# Patient Record
Sex: MALE | Race: WHITE | Employment: FULL TIME
[De-identification: names, ages, dates, MRNs, and addresses within clinical notes are randomized per-mention and may not be internally consistent; named-entity substitution may affect disease eponyms.]

---

## 2017-01-11 ENCOUNTER — OFFICE VISIT (OUTPATIENT)
Dept: INTERNAL MEDICINE | Facility: CLINIC | Age: 52
End: 2017-01-11

## 2017-01-11 VITALS
SYSTOLIC BLOOD PRESSURE: 110 MMHG | WEIGHT: 242 LBS | RESPIRATION RATE: 14 BRPM | HEIGHT: 72 IN | DIASTOLIC BLOOD PRESSURE: 80 MMHG | BODY MASS INDEX: 32.78 KG/M2

## 2017-01-11 DIAGNOSIS — E11.9 TYPE 2 DIABETES MELLITUS WITHOUT COMPLICATION, WITHOUT LONG-TERM CURRENT USE OF INSULIN (HCC): Primary | ICD-10-CM

## 2017-01-11 DIAGNOSIS — D12.6 TUBULAR ADENOMA OF COLON: ICD-10-CM

## 2017-01-11 DIAGNOSIS — N20.0 URIC ACID NEPHROLITHIASIS: ICD-10-CM

## 2017-01-11 PROCEDURE — 99213 OFFICE O/P EST LOW 20 MIN: CPT | Performed by: INTERNAL MEDICINE

## 2017-02-02 ENCOUNTER — CARE COORDINATION (OUTPATIENT)
Dept: CARE COORDINATION | Facility: CLINIC | Age: 52
End: 2017-02-02

## 2017-02-22 RX ORDER — ATORVASTATIN CALCIUM 10 MG/1
TABLET, FILM COATED ORAL
Qty: 30 TABLET | Refills: 10 | Status: SHIPPED | OUTPATIENT
Start: 2017-02-22 | End: 2017-05-03 | Stop reason: SDUPTHER

## 2017-04-12 ENCOUNTER — CARE COORDINATION (OUTPATIENT)
Dept: CARE COORDINATION | Age: 52
End: 2017-04-12

## 2017-04-26 RX ORDER — ALLOPURINOL 300 MG/1
TABLET ORAL
Qty: 90 TABLET | Refills: 3 | Status: SHIPPED | OUTPATIENT
Start: 2017-04-26 | End: 2017-12-14 | Stop reason: SDUPTHER

## 2017-04-27 ENCOUNTER — TELEPHONE (OUTPATIENT)
Dept: INTERNAL MEDICINE CLINIC | Age: 52
End: 2017-04-27

## 2017-04-27 DIAGNOSIS — E78.00 PURE HYPERCHOLESTEROLEMIA: ICD-10-CM

## 2017-04-27 DIAGNOSIS — E11.9 TYPE 2 DIABETES MELLITUS WITHOUT COMPLICATION, WITHOUT LONG-TERM CURRENT USE OF INSULIN (HCC): Primary | ICD-10-CM

## 2017-04-28 ENCOUNTER — HOSPITAL ENCOUNTER (OUTPATIENT)
Age: 52
Discharge: HOME | End: 2017-04-28
Payer: COMMERCIAL

## 2017-04-28 DIAGNOSIS — E78.00 PURE HYPERCHOLESTEROLEMIA: ICD-10-CM

## 2017-04-28 DIAGNOSIS — E11.9 TYPE 2 DIABETES MELLITUS WITHOUT COMPLICATION, WITHOUT LONG-TERM CURRENT USE OF INSULIN (HCC): ICD-10-CM

## 2017-04-28 LAB
ABSOLUTE EOS #: 0.09 K/UL (ref 0–0.4)
ABSOLUTE LYMPH #: 2.14 K/UL (ref 1–4.8)
ABSOLUTE MONO #: 0.39 K/UL (ref 0.1–1.3)
ALBUMIN SERPL-MCNC: 4.8 G/DL (ref 3.5–5.2)
ALBUMIN/GLOBULIN RATIO: ABNORMAL (ref 1–2.5)
ALP BLD-CCNC: 128 U/L (ref 40–129)
ALT SERPL-CCNC: 30 U/L (ref 5–41)
ANION GAP SERPL CALCULATED.3IONS-SCNC: 16 MMOL/L (ref 9–17)
AST SERPL-CCNC: 21 U/L
BASOPHILS # BLD: 0 %
BASOPHILS ABSOLUTE: 0 K/UL (ref 0–0.2)
BILIRUB SERPL-MCNC: 0.41 MG/DL (ref 0.3–1.2)
BUN BLDV-MCNC: 21 MG/DL (ref 6–20)
BUN/CREAT BLD: ABNORMAL (ref 9–20)
CALCIUM SERPL-MCNC: 9.9 MG/DL (ref 8.6–10.4)
CHLORIDE BLD-SCNC: 101 MMOL/L (ref 98–107)
CHOLESTEROL/HDL RATIO: 3
CHOLESTEROL: 124 MG/DL
CO2: 20 MMOL/L (ref 20–31)
CREAT SERPL-MCNC: 1.08 MG/DL (ref 0.7–1.2)
CREATININE URINE: 79.8 MG/DL (ref 39–259)
DIFFERENTIAL TYPE: ABNORMAL
EOSINOPHILS RELATIVE PERCENT: 2 %
ESTIMATED AVERAGE GLUCOSE: 194 MG/DL
GFR AFRICAN AMERICAN: >60 ML/MIN
GFR NON-AFRICAN AMERICAN: >60 ML/MIN
GFR SERPL CREATININE-BSD FRML MDRD: ABNORMAL ML/MIN/{1.73_M2}
GFR SERPL CREATININE-BSD FRML MDRD: ABNORMAL ML/MIN/{1.73_M2}
GLUCOSE BLD-MCNC: 146 MG/DL (ref 70–99)
HBA1C MFR BLD: 8.4 % (ref 4–6)
HCT VFR BLD CALC: 48.6 % (ref 41–53)
HDLC SERPL-MCNC: 41 MG/DL
HEMOGLOBIN: 16.1 G/DL (ref 13.5–17.5)
LDL CHOLESTEROL: 41 MG/DL (ref 0–130)
LYMPHOCYTES # BLD: 50 %
MCH RBC QN AUTO: 26.9 PG (ref 26–34)
MCHC RBC AUTO-ENTMCNC: 33.1 G/DL (ref 31–37)
MCV RBC AUTO: 81.3 FL (ref 80–100)
MICROALBUMIN/CREAT 24H UR: 25 MG/L
MICROALBUMIN/CREAT UR-RTO: 31 MCG/MG CREAT
MONOCYTES # BLD: 9 %
MORPHOLOGY: NORMAL
PDW BLD-RTO: 13.2 % (ref 11.5–14.9)
PLATELET # BLD: 270 K/UL (ref 150–450)
PLATELET ESTIMATE: ABNORMAL
PMV BLD AUTO: 7.2 FL (ref 6–12)
POTASSIUM SERPL-SCNC: 4.6 MMOL/L (ref 3.7–5.3)
RBC # BLD: 5.98 M/UL (ref 4.5–5.9)
RBC # BLD: ABNORMAL 10*6/UL
SEG NEUTROPHILS: 39 %
SEGMENTED NEUTROPHILS ABSOLUTE COUNT: 1.68 K/UL (ref 1.3–9.1)
SODIUM BLD-SCNC: 137 MMOL/L (ref 135–144)
TOTAL PROTEIN: 7.9 G/DL (ref 6.4–8.3)
TRIGL SERPL-MCNC: 208 MG/DL
TSH SERPL DL<=0.05 MIU/L-ACNC: 0.71 MIU/L (ref 0.3–5)
VITAMIN D 25-HYDROXY: 13.9 NG/ML (ref 30–100)
VLDLC SERPL CALC-MCNC: ABNORMAL MG/DL (ref 1–30)
WBC # BLD: 4.3 K/UL (ref 3.5–11)
WBC # BLD: ABNORMAL 10*3/UL

## 2017-04-28 PROCEDURE — 80053 COMPREHEN METABOLIC PANEL: CPT

## 2017-04-28 PROCEDURE — 83036 HEMOGLOBIN GLYCOSYLATED A1C: CPT

## 2017-04-28 PROCEDURE — 80061 LIPID PANEL: CPT

## 2017-04-28 PROCEDURE — 82306 VITAMIN D 25 HYDROXY: CPT

## 2017-04-28 PROCEDURE — 85025 COMPLETE CBC W/AUTO DIFF WBC: CPT

## 2017-04-28 PROCEDURE — 82043 UR ALBUMIN QUANTITATIVE: CPT

## 2017-04-28 PROCEDURE — 36415 COLL VENOUS BLD VENIPUNCTURE: CPT

## 2017-04-28 PROCEDURE — 82570 ASSAY OF URINE CREATININE: CPT

## 2017-04-28 PROCEDURE — 84443 ASSAY THYROID STIM HORMONE: CPT

## 2017-05-03 ENCOUNTER — OFFICE VISIT (OUTPATIENT)
Dept: INTERNAL MEDICINE CLINIC | Age: 52
End: 2017-05-03
Payer: COMMERCIAL

## 2017-05-03 VITALS
HEIGHT: 72 IN | BODY MASS INDEX: 33.18 KG/M2 | SYSTOLIC BLOOD PRESSURE: 98 MMHG | RESPIRATION RATE: 14 BRPM | DIASTOLIC BLOOD PRESSURE: 56 MMHG | WEIGHT: 245 LBS

## 2017-05-03 DIAGNOSIS — E11.9 TYPE 2 DIABETES MELLITUS WITHOUT COMPLICATION, WITHOUT LONG-TERM CURRENT USE OF INSULIN (HCC): Primary | ICD-10-CM

## 2017-05-03 DIAGNOSIS — N20.0 URIC ACID NEPHROLITHIASIS: ICD-10-CM

## 2017-05-03 DIAGNOSIS — E78.00 PURE HYPERCHOLESTEROLEMIA: ICD-10-CM

## 2017-05-03 PROCEDURE — 99214 OFFICE O/P EST MOD 30 MIN: CPT | Performed by: INTERNAL MEDICINE

## 2017-05-03 RX ORDER — ATORVASTATIN CALCIUM 10 MG/1
TABLET, FILM COATED ORAL
Qty: 30 TABLET | Refills: 10 | Status: SHIPPED | OUTPATIENT
Start: 2017-05-03 | End: 2017-05-04 | Stop reason: SDUPTHER

## 2017-05-03 RX ORDER — ERGOCALCIFEROL (VITAMIN D2) 1250 MCG
50000 CAPSULE ORAL WEEKLY
Qty: 12 CAPSULE | Refills: 3 | Status: SHIPPED | OUTPATIENT
Start: 2017-05-03 | End: 2017-05-04 | Stop reason: SDUPTHER

## 2017-05-03 ASSESSMENT — PATIENT HEALTH QUESTIONNAIRE - PHQ9
SUM OF ALL RESPONSES TO PHQ QUESTIONS 1-9: 0
SUM OF ALL RESPONSES TO PHQ9 QUESTIONS 1 & 2: 0
2. FEELING DOWN, DEPRESSED OR HOPELESS: 0
1. LITTLE INTEREST OR PLEASURE IN DOING THINGS: 0

## 2017-05-04 RX ORDER — ERGOCALCIFEROL (VITAMIN D2) 1250 MCG
50000 CAPSULE ORAL WEEKLY
Qty: 12 CAPSULE | Refills: 3 | Status: SHIPPED | OUTPATIENT
Start: 2017-05-04 | End: 2017-07-25 | Stop reason: SDUPTHER

## 2017-05-04 RX ORDER — ATORVASTATIN CALCIUM 10 MG/1
TABLET, FILM COATED ORAL
Qty: 30 TABLET | Refills: 10 | Status: ON HOLD | OUTPATIENT
Start: 2017-05-04 | End: 2018-01-22

## 2017-06-19 ENCOUNTER — CARE COORDINATION (OUTPATIENT)
Dept: CARE COORDINATION | Age: 52
End: 2017-06-19

## 2017-07-01 LAB
AVERAGE GLUCOSE: NORMAL
HBA1C MFR BLD: 7.2 %

## 2017-07-25 ENCOUNTER — CARE COORDINATION (OUTPATIENT)
Dept: CARE COORDINATION | Age: 52
End: 2017-07-25

## 2017-07-25 RX ORDER — METHOCARBAMOL 750 MG/1
1 TABLET ORAL WEEKLY
Refills: 3 | COMMUNITY
Start: 2017-06-25 | End: 2019-08-08 | Stop reason: SDUPTHER

## 2017-07-28 ENCOUNTER — CARE COORDINATION (OUTPATIENT)
Dept: CARE COORDINATION | Age: 52
End: 2017-07-28

## 2017-09-05 ENCOUNTER — CARE COORDINATION (OUTPATIENT)
Dept: CARE COORDINATION | Age: 52
End: 2017-09-05

## 2017-09-05 DIAGNOSIS — E11.9 TYPE 2 DIABETES MELLITUS WITHOUT COMPLICATION, WITHOUT LONG-TERM CURRENT USE OF INSULIN (HCC): Primary | ICD-10-CM

## 2017-10-04 DIAGNOSIS — E11.9 TYPE 2 DIABETES MELLITUS WITHOUT COMPLICATION, WITHOUT LONG-TERM CURRENT USE OF INSULIN (HCC): Primary | ICD-10-CM

## 2017-10-04 DIAGNOSIS — E11.9 TYPE 2 DIABETES MELLITUS WITHOUT COMPLICATION, WITHOUT LONG-TERM CURRENT USE OF INSULIN (HCC): ICD-10-CM

## 2017-10-05 ENCOUNTER — OFFICE VISIT (OUTPATIENT)
Dept: INTERNAL MEDICINE CLINIC | Age: 52
End: 2017-10-05
Payer: COMMERCIAL

## 2017-10-05 ENCOUNTER — CARE COORDINATION (OUTPATIENT)
Dept: CARE COORDINATION | Age: 52
End: 2017-10-05

## 2017-10-05 VITALS
WEIGHT: 243 LBS | BODY MASS INDEX: 32.91 KG/M2 | SYSTOLIC BLOOD PRESSURE: 110 MMHG | HEIGHT: 72 IN | DIASTOLIC BLOOD PRESSURE: 73 MMHG

## 2017-10-05 DIAGNOSIS — M54.5 LOW BACK PAIN, UNSPECIFIED BACK PAIN LATERALITY, UNSPECIFIED CHRONICITY, WITH SCIATICA PRESENCE UNSPECIFIED: ICD-10-CM

## 2017-10-05 DIAGNOSIS — M50.30 DEGENERATIVE DISC DISEASE, CERVICAL: ICD-10-CM

## 2017-10-05 DIAGNOSIS — N20.0 URIC ACID NEPHROLITHIASIS: ICD-10-CM

## 2017-10-05 DIAGNOSIS — G89.29 CHRONIC BILATERAL LOW BACK PAIN WITHOUT SCIATICA: ICD-10-CM

## 2017-10-05 DIAGNOSIS — Z86.010 HISTORY OF COLON POLYPS: ICD-10-CM

## 2017-10-05 DIAGNOSIS — M54.50 CHRONIC BILATERAL LOW BACK PAIN WITHOUT SCIATICA: ICD-10-CM

## 2017-10-05 DIAGNOSIS — E11.9 TYPE 2 DIABETES MELLITUS WITHOUT COMPLICATION, WITHOUT LONG-TERM CURRENT USE OF INSULIN (HCC): Primary | ICD-10-CM

## 2017-10-05 DIAGNOSIS — E11.9 TYPE 2 DIABETES MELLITUS WITHOUT COMPLICATION, WITHOUT LONG-TERM CURRENT USE OF INSULIN (HCC): ICD-10-CM

## 2017-10-05 PROCEDURE — 99214 OFFICE O/P EST MOD 30 MIN: CPT | Performed by: INTERNAL MEDICINE

## 2017-10-05 ASSESSMENT — ENCOUNTER SYMPTOMS: BACK PAIN: 1

## 2017-10-05 NOTE — CARE COORDINATION
RNCC met with patient while her in office for appointment. He has not had his diabetic education class yet, he said someone had called him. Gave him the number at [address] to call. A1C down from 8.4 to 7.2. He is also down couple of pounds. States he is trying to eat better. Labs ordered for vit d recheck as he was low few months ago.      Lab Results   Component Value Date    LABA1C 7.2 09/11/2017    LABA1C 8.4 (H) 04/28/2017    LABA1C 7.9 (H) 12/28/2016     Lab Results   Component Value Date    LABMICR 31 (H) 04/28/2017    CREATININE 1.08 04/28/2017     Wt Readings from Last 3 Encounters:   10/05/17 243 lb (110.2 kg)   05/03/17 245 lb (111.1 kg)   01/11/17 242 lb (109.8 kg)

## 2017-10-05 NOTE — MR AVS SNAPSHOT
people who are more muscular. Even a small weight loss (between 5 and 10 percent of your current weight) by decreasing your calorie intake and becoming more physically active will help lower your risk of developing or worsening diseases associated with obesity. Learn more at: Rachel.co.uk             Today's Medication Changes          These changes are accurate as of: 10/5/17 12:59 PM.  If you have any questions, ask your nurse or doctor. START taking these medications           empagliflozin 25 MG tablet   Commonly known as:  JARDIANCE   Instructions: Take 25 mg by mouth daily   Quantity:  30 tablet   Refills:  3         STOP taking these medications           canagliflozin 300 MG Tabs tablet   Commonly known as:  INVOKANA       ibuprofen 800 MG tablet   Commonly known as:  ADVIL;MOTRIN       ondansetron 4 MG disintegrating tablet   Commonly known as:  ZOFRAN ODT            Where to Get Your Medications      These medications were sent to [facility], [address] [place] [address] [id number], Replaced by [facility] [address]     Phone:  [phone]     empagliflozin 25 MG tablet    metFORMIN 1000 MG tablet               Your Current Medications Are              metFORMIN (GLUCOPHAGE) 1000 MG tablet Take 1 tablet by mouth 2 times daily (with meals)    empagliflozin (JARDIANCE) 25 MG tablet Take 25 mg by mouth daily    D3-50 27027 units CAPS Take 1 capsule by mouth once a week    atorvastatin (LIPITOR) 10 MG tablet TAKE 1 TABLET BY MOUTH ONE TIME A DAY    allopurinol (ZYLOPRIM) 300 MG tablet TAKE ONE TABLET BY MOUTH ONCE DAILY    ONE TOUCH LANCETS MISC 1 each by Does not apply route daily    glucose blood VI test strips (ASCENSIA AUTODISC VI;ONE TOUCH ULTRA TEST VI) strip Use with associated glucose meter.     Alcohol Swabs PADS Apply 1 each topically 3 times daily Pneumococcal Polysaccharide (Baruyqubp92) 3/17/2015, 11/20/2013    Tdap (Boostrix, Adacel) 8/27/2015      Preventive Care        Date Due    Hepatitis C screening is recommended for all adults regardless of risk factors born between [facility] at least once (lifetime) who have never been tested. 1965    HIV screening is recommended for all people regardless of risk factors  aged 15-65 years at least once (lifetime) who have never been HIV tested. 1/19/1980    Diabetic Foot Exam 3/2/2016    Yearly Flu Vaccine (1) 9/1/2017    Eye Exam By An Eye Doctor 3/30/2018    Urine Check For Kidney Problems 4/28/2018    Cholesterol Screening 4/28/2018    Hemoglobin A1C (Test For Long-Term Glucose Control) 9/11/2018    Colonoscopy 7/16/2021    Tetanus Combination Vaccine (2 - Td) 8/27/2025            Spanglehart Signup           Our records indicate that you have an active InitMe account. You can view your After Visit Summary by going to [email or web address] and logging in with your InitMe username and password. If you don't have a InitMe username and password but a parent or guardian has access to your record, the parent or guardian should login with their own InitMe username and password and access your record to view the After Visit Summary. Additional Information  If you have questions, please contact the physician practice where you receive care. Remember, InitMe is NOT to be used for urgent needs. For medical emergencies, dial 911. For questions regarding your InitMe account call [phone]. If you have a clinical question, please call your doctor's office.

## 2017-10-05 NOTE — PROGRESS NOTES
Visit Information    Have you changed or started any medications since your last visit including any over-the-counter medicines, vitamins, or herbal medicines? no   Are you having any side effects from any of your medications? -  no  Have you stopped taking any of your medications? Is so, why? -  no    Have you seen any other physician or provider since your last visit? Yes - Records Obtained  Have you had any other diagnostic tests since your last visit? Yes - Records Obtained  Have you been seen in the emergency room and/or had an admission to a hospital since we last saw you? No  Have you had your routine dental cleaning in the past 6 months? yes - 10/04/17    Have you activated your Navisw[employer] account? If not, what are your barriers?  Yes     Patient Care Team:  Deniz Reddy MD as PCP - Manish Cross MD as PCP - S Attributed Provider  Lashay Younger MD as Consulting Physician (Infectious Diseases)  Chris Lemos MD as Consulting Physician (Plastic Surgery)  Anders Gomez MD as Consulting Physician (Gastroenterology)  Nia Diaz RN as Care Coordinator    Medical History Review  Past Medical, Family, and Social History reviewed and does not contribute to the patient presenting condition    Health Maintenance   Topic Date Due    Hepatitis C screen  1965    HIV screen  01/19/1980    Diabetic foot exam  03/02/2016    Flu vaccine (1) 09/01/2017    Diabetic retinal exam  03/30/2018    Diabetic microalbuminuria test  04/28/2018    Lipid screen  04/28/2018    Diabetic hemoglobin A1C test  09/11/2018    Colon cancer screen colonoscopy  07/16/2021    DTaP/Tdap/Td vaccine (2 - Td) 08/27/2025    Pneumococcal med risk  Completed     Chief Complaint   Patient presents with    Diabetes     last a1c 7.2 on 07/ sugar diary maintaine [] yes    [x] no           - TAKING MEDICATIONS  AS PRESCRIBED , NO ADVERSE EFFECTS . -hisham has lost weight significantly-  He is hemoglobin A1c etc. are in good shape   There is a borderline elevation of microalbumin   We are going to repeat the test       ROS AS NOTED IN HPI ,    Other  positive -- Review of Systems   Musculoskeletal: Positive for back pain. ALL OTHER  SYSTEM REVIEW NEGATIVE. Social History   Substance Use Topics    Smoking status: Never Smoker    Smokeless tobacco: Never Used    Alcohol use No     Fentanyl; Lisinopril; and Other     FAMILY  And SOCIAL HISTORY:   Reviewed and No change from previous visit     Allergies; medicatons; past medical, surgical, family, and social history; and problem list reviewed by me, as indicated in this encounter  . OBJECTIVE      Physical exam      Vitals:    10/05/17 1234   BP: 110/73   Weight: 243 lb (110.2 kg)   Height: 6' 0.05\" (1.83 m)      Estimated body mass index is 32.91 kg/(m^2) as calculated from the following:    Height as of this encounter: 6' 0.05\" (1.83 m). Weight as of this encounter: 243 lb (110.2 kg). Physical Exam   Constitutional: He is cooperative. Neck: Carotid bruit is not present. No thyroid mass and no thyromegaly present. Cardiovascular: Normal rate, regular rhythm and normal heart sounds. Exam reveals no S3. No murmur heard. Pulmonary/Chest: Effort normal and breath sounds normal.   Neurological: He is alert. He has normal strength. Skin: Skin is warm and dry. No rash noted. Psychiatric: He has a normal mood and affect.              DIAGNOSTICS / INSERTS / IMAGES      [x] Reviewed  ;       Labs           Chemistry        Component Value Date/Time     04/28/2017 0908    K 4.6 04/28/2017 0908     04/28/2017 0908    CO2 20 04/28/2017 0908    BUN 21 (H) 04/28/2017 0908    CREATININE 1.08 04/28/2017 0908        Component Value Date/Time    CALCIUM 9.9

## 2017-10-20 ENCOUNTER — HOSPITAL ENCOUNTER (OUTPATIENT)
Age: 52
Discharge: HOME | End: 2017-10-20
Payer: COMMERCIAL

## 2017-10-20 DIAGNOSIS — E11.9 TYPE 2 DIABETES MELLITUS WITHOUT COMPLICATION, WITHOUT LONG-TERM CURRENT USE OF INSULIN (HCC): ICD-10-CM

## 2017-10-20 LAB
ALBUMIN SERPL-MCNC: 4.3 G/DL (ref 3.5–5.2)
ALBUMIN/GLOBULIN RATIO: ABNORMAL (ref 1–2.5)
ALP BLD-CCNC: 132 U/L (ref 40–129)
ALT SERPL-CCNC: 22 U/L (ref 5–41)
ANION GAP SERPL CALCULATED.3IONS-SCNC: 14 MMOL/L (ref 9–17)
AST SERPL-CCNC: 16 U/L
BILIRUB SERPL-MCNC: 0.44 MG/DL (ref 0.3–1.2)
BUN BLDV-MCNC: 20 MG/DL (ref 6–20)
BUN/CREAT BLD: ABNORMAL (ref 9–20)
CALCIUM SERPL-MCNC: 9.4 MG/DL (ref 8.6–10.4)
CHLORIDE BLD-SCNC: 100 MMOL/L (ref 98–107)
CHOLESTEROL/HDL RATIO: 2.9
CHOLESTEROL: 133 MG/DL
CO2: 24 MMOL/L (ref 20–31)
CREAT SERPL-MCNC: 1.1 MG/DL (ref 0.7–1.2)
GFR AFRICAN AMERICAN: >60 ML/MIN
GFR NON-AFRICAN AMERICAN: >60 ML/MIN
GFR SERPL CREATININE-BSD FRML MDRD: ABNORMAL ML/MIN/{1.73_M2}
GFR SERPL CREATININE-BSD FRML MDRD: ABNORMAL ML/MIN/{1.73_M2}
GLUCOSE BLD-MCNC: 176 MG/DL (ref 70–99)
HCT VFR BLD CALC: 48.1 % (ref 41–53)
HDLC SERPL-MCNC: 46 MG/DL
HEMOGLOBIN: 16.2 G/DL (ref 13.5–17.5)
LDL CHOLESTEROL: 46 MG/DL (ref 0–130)
MCH RBC QN AUTO: 27.9 PG (ref 26–34)
MCHC RBC AUTO-ENTMCNC: 33.6 G/DL (ref 31–37)
MCV RBC AUTO: 83 FL (ref 80–100)
PDW BLD-RTO: 13.4 % (ref 11.5–14.9)
PLATELET # BLD: 284 K/UL (ref 150–450)
PMV BLD AUTO: 6.8 FL (ref 6–12)
POTASSIUM SERPL-SCNC: 4.3 MMOL/L (ref 3.7–5.3)
RBC # BLD: 5.8 M/UL (ref 4.5–5.9)
SODIUM BLD-SCNC: 138 MMOL/L (ref 135–144)
TOTAL PROTEIN: 7.6 G/DL (ref 6.4–8.3)
TRIGL SERPL-MCNC: 206 MG/DL
TSH SERPL DL<=0.05 MIU/L-ACNC: 1.07 MIU/L (ref 0.3–5)
VITAMIN D 25-HYDROXY: 29.5 NG/ML (ref 30–100)
VLDLC SERPL CALC-MCNC: ABNORMAL MG/DL (ref 1–30)
WBC # BLD: 4.2 K/UL (ref 3.5–11)

## 2017-10-20 PROCEDURE — 36415 COLL VENOUS BLD VENIPUNCTURE: CPT

## 2017-10-20 PROCEDURE — 80053 COMPREHEN METABOLIC PANEL: CPT

## 2017-10-20 PROCEDURE — 85027 COMPLETE CBC AUTOMATED: CPT

## 2017-10-20 PROCEDURE — 84443 ASSAY THYROID STIM HORMONE: CPT

## 2017-10-20 PROCEDURE — 82306 VITAMIN D 25 HYDROXY: CPT

## 2017-10-20 PROCEDURE — 80061 LIPID PANEL: CPT

## 2017-10-25 DIAGNOSIS — E11.9 TYPE 2 DIABETES MELLITUS WITHOUT COMPLICATION, WITHOUT LONG-TERM CURRENT USE OF INSULIN (HCC): Primary | ICD-10-CM

## 2017-11-09 ENCOUNTER — CARE COORDINATION (OUTPATIENT)
Dept: CARE COORDINATION | Age: 52
End: 2017-11-09

## 2017-11-13 ENCOUNTER — CARE COORDINATION (OUTPATIENT)
Dept: CARE COORDINATION | Age: 52
End: 2017-11-13

## 2017-12-14 DIAGNOSIS — E11.9 TYPE 2 DIABETES MELLITUS WITHOUT COMPLICATION, WITHOUT LONG-TERM CURRENT USE OF INSULIN (HCC): ICD-10-CM

## 2017-12-14 NOTE — TELEPHONE ENCOUNTER
Patient called and would like fo ejardiance to be called in for 90 days to [facility] OP and allopurinal to [employer]

## 2017-12-15 RX ORDER — ALLOPURINOL 300 MG/1
300 TABLET ORAL DAILY
Qty: 90 TABLET | Refills: 3 | Status: SHIPPED | OUTPATIENT
Start: 2017-12-15 | End: 2018-08-02 | Stop reason: SDUPTHER

## 2018-01-21 ENCOUNTER — HOSPITAL ENCOUNTER (INPATIENT)
Age: 53
LOS: 1 days | Discharge: HOME | DRG: 881 | End: 2018-01-22
Attending: EMERGENCY MEDICINE | Admitting: PSYCHIATRY & NEUROLOGY
Payer: COMMERCIAL

## 2018-01-21 DIAGNOSIS — T50.902A SUICIDE ATTEMPT BY DRUG INGESTION, INITIAL ENCOUNTER (HCC): ICD-10-CM

## 2018-01-21 DIAGNOSIS — T40.602A INTENTIONAL OPIATE OVERDOSE, INITIAL ENCOUNTER (HCC): Primary | ICD-10-CM

## 2018-01-21 PROBLEM — F32.A DEPRESSION, ACUTE: Status: ACTIVE | Noted: 2018-01-21

## 2018-01-21 PROBLEM — F43.21 ADJUSTMENT DISORDER WITH DEPRESSED MOOD: Status: ACTIVE | Noted: 2018-01-21

## 2018-01-21 PROBLEM — F32.A DEPRESSION, ACUTE: Status: RESOLVED | Noted: 2018-01-21 | Resolved: 2018-01-21

## 2018-01-21 LAB
ABSOLUTE EOS #: 0.1 K/UL (ref 0–0.4)
ABSOLUTE IMMATURE GRANULOCYTE: ABNORMAL K/UL (ref 0–0.3)
ABSOLUTE LYMPH #: 2.3 K/UL (ref 1–4.8)
ABSOLUTE MONO #: 0.5 K/UL (ref 0.1–1.3)
ACETAMINOPHEN LEVEL: 43 UG/ML (ref 10–30)
ALBUMIN SERPL-MCNC: 4.2 G/DL (ref 3.5–5.2)
ALBUMIN/GLOBULIN RATIO: NORMAL (ref 1–2.5)
ALP BLD-CCNC: 106 U/L (ref 40–129)
ALT SERPL-CCNC: 25 U/L (ref 5–41)
AMPHETAMINE SCREEN URINE: NEGATIVE
ANION GAP SERPL CALCULATED.3IONS-SCNC: 16 MMOL/L (ref 9–17)
AST SERPL-CCNC: 20 U/L
BARBITURATE SCREEN URINE: NEGATIVE
BASOPHILS # BLD: 0 % (ref 0–2)
BASOPHILS ABSOLUTE: 0 K/UL (ref 0–0.2)
BENZODIAZEPINE SCREEN, URINE: NEGATIVE
BILIRUB SERPL-MCNC: 0.42 MG/DL (ref 0.3–1.2)
BILIRUBIN DIRECT: 0.14 MG/DL
BILIRUBIN URINE: NEGATIVE
BILIRUBIN, INDIRECT: 0.28 MG/DL (ref 0–1)
BUN BLDV-MCNC: 19 MG/DL (ref 6–20)
BUN/CREAT BLD: ABNORMAL (ref 9–20)
BUPRENORPHINE URINE: ABNORMAL
CALCIUM SERPL-MCNC: 9.3 MG/DL (ref 8.6–10.4)
CANNABINOID SCREEN URINE: NEGATIVE
CHLORIDE BLD-SCNC: 99 MMOL/L (ref 98–107)
CO2: 23 MMOL/L (ref 20–31)
COCAINE METABOLITE, URINE: NEGATIVE
COLOR: YELLOW
COMMENT UA: ABNORMAL
CREAT SERPL-MCNC: 1.16 MG/DL (ref 0.7–1.2)
DIFFERENTIAL TYPE: ABNORMAL
EKG ATRIAL RATE: 67 BPM
EKG P AXIS: 39 DEGREES
EKG P-R INTERVAL: 164 MS
EKG Q-T INTERVAL: 408 MS
EKG QRS DURATION: 110 MS
EKG QTC CALCULATION (BAZETT): 431 MS
EKG R AXIS: -21 DEGREES
EKG T AXIS: 22 DEGREES
EKG VENTRICULAR RATE: 67 BPM
EOSINOPHILS RELATIVE PERCENT: 1 % (ref 0–4)
ETHANOL PERCENT: <0.01 %
ETHANOL: <10 MG/DL
GFR AFRICAN AMERICAN: >60 ML/MIN
GFR NON-AFRICAN AMERICAN: >60 ML/MIN
GFR SERPL CREATININE-BSD FRML MDRD: ABNORMAL ML/MIN/{1.73_M2}
GFR SERPL CREATININE-BSD FRML MDRD: ABNORMAL ML/MIN/{1.73_M2}
GLOBULIN: NORMAL G/DL (ref 1.5–3.8)
GLUCOSE BLD-MCNC: 156 MG/DL (ref 70–99)
GLUCOSE URINE: ABNORMAL
HCT VFR BLD CALC: 45 % (ref 41–53)
HEMOGLOBIN: 15 G/DL (ref 13.5–17.5)
IMMATURE GRANULOCYTES: ABNORMAL %
KETONES, URINE: NEGATIVE
LEUKOCYTE ESTERASE, URINE: NEGATIVE
LYMPHOCYTES # BLD: 46 % (ref 24–44)
MCH RBC QN AUTO: 27.8 PG (ref 26–34)
MCHC RBC AUTO-ENTMCNC: 33.4 G/DL (ref 31–37)
MCV RBC AUTO: 83.1 FL (ref 80–100)
MDMA URINE: ABNORMAL
METHADONE SCREEN, URINE: NEGATIVE
METHAMPHETAMINE, URINE: ABNORMAL
MONOCYTES # BLD: 9 % (ref 1–7)
NITRITE, URINE: NEGATIVE
NRBC AUTOMATED: ABNORMAL PER 100 WBC
OPIATES, URINE: NEGATIVE
OXYCODONE SCREEN URINE: POSITIVE
PDW BLD-RTO: 13 % (ref 11.5–14.9)
PH UA: 5 (ref 5–8)
PHENCYCLIDINE, URINE: NEGATIVE
PLATELET # BLD: 300 K/UL (ref 150–450)
PLATELET ESTIMATE: ABNORMAL
PMV BLD AUTO: 7 FL (ref 6–12)
POTASSIUM SERPL-SCNC: 3.9 MMOL/L (ref 3.7–5.3)
PROPOXYPHENE, URINE: ABNORMAL
PROTEIN UA: NEGATIVE
RBC # BLD: 5.42 M/UL (ref 4.5–5.9)
RBC # BLD: ABNORMAL 10*6/UL
SALICYLATE LEVEL: <1 MG/DL (ref 3–10)
SEG NEUTROPHILS: 44 % (ref 36–66)
SEGMENTED NEUTROPHILS ABSOLUTE COUNT: 2.3 K/UL (ref 1.3–9.1)
SODIUM BLD-SCNC: 138 MMOL/L (ref 135–144)
SPECIFIC GRAVITY UA: 1.04 (ref 1–1.03)
TEST INFORMATION: ABNORMAL
TOTAL PROTEIN: 7.4 G/DL (ref 6.4–8.3)
TRICYCLIC ANTIDEP,URINE: NEGATIVE
TRICYCLIC ANTIDEPRESSANTS, UR: ABNORMAL
TROPONIN INTERP: NORMAL
TROPONIN T: <0.03 NG/ML
TURBIDITY: CLEAR
URINE HGB: NEGATIVE
UROBILINOGEN, URINE: NORMAL
WBC # BLD: 5.2 K/UL (ref 3.5–11)
WBC # BLD: ABNORMAL 10*3/UL

## 2018-01-21 PROCEDURE — 6370000000 HC RX 637 (ALT 250 FOR IP): Performed by: PSYCHIATRY & NEUROLOGY

## 2018-01-21 PROCEDURE — 93005 ELECTROCARDIOGRAM TRACING: CPT

## 2018-01-21 PROCEDURE — 2580000003 HC RX 258: Performed by: EMERGENCY MEDICINE

## 2018-01-21 PROCEDURE — 80048 BASIC METABOLIC PNL TOTAL CA: CPT

## 2018-01-21 PROCEDURE — 1240000000 HC EMOTIONAL WELLNESS R&B

## 2018-01-21 PROCEDURE — 6360000002 HC RX W HCPCS: Performed by: PSYCHIATRY & NEUROLOGY

## 2018-01-21 PROCEDURE — 99285 EMERGENCY DEPT VISIT HI MDM: CPT

## 2018-01-21 PROCEDURE — 85025 COMPLETE CBC W/AUTO DIFF WBC: CPT

## 2018-01-21 PROCEDURE — 80076 HEPATIC FUNCTION PANEL: CPT

## 2018-01-21 PROCEDURE — 84484 ASSAY OF TROPONIN QUANT: CPT

## 2018-01-21 PROCEDURE — 6360000002 HC RX W HCPCS: Performed by: EMERGENCY MEDICINE

## 2018-01-21 PROCEDURE — 96374 THER/PROPH/DIAG INJ IV PUSH: CPT

## 2018-01-21 PROCEDURE — 80307 DRUG TEST PRSMV CHEM ANLYZR: CPT

## 2018-01-21 PROCEDURE — 96376 TX/PRO/DX INJ SAME DRUG ADON: CPT

## 2018-01-21 PROCEDURE — 99254 IP/OBS CNSLTJ NEW/EST MOD 60: CPT | Performed by: INTERNAL MEDICINE

## 2018-01-21 PROCEDURE — 96375 TX/PRO/DX INJ NEW DRUG ADDON: CPT

## 2018-01-21 PROCEDURE — 36415 COLL VENOUS BLD VENIPUNCTURE: CPT

## 2018-01-21 PROCEDURE — 81003 URINALYSIS AUTO W/O SCOPE: CPT

## 2018-01-21 PROCEDURE — G0480 DRUG TEST DEF 1-7 CLASSES: HCPCS

## 2018-01-21 RX ORDER — ONDANSETRON 4 MG/1
8 TABLET, ORALLY DISINTEGRATING ORAL 4 TIMES DAILY PRN
Status: DISCONTINUED | OUTPATIENT
Start: 2018-01-21 | End: 2018-01-22 | Stop reason: HOSPADM

## 2018-01-21 RX ORDER — ONDANSETRON 2 MG/ML
4 INJECTION INTRAMUSCULAR; INTRAVENOUS ONCE
Status: COMPLETED | OUTPATIENT
Start: 2018-01-21 | End: 2018-01-21

## 2018-01-21 RX ORDER — ASPIRIN 81 MG/1
81 TABLET ORAL DAILY
Status: DISCONTINUED | OUTPATIENT
Start: 2018-01-21 | End: 2018-01-22 | Stop reason: HOSPADM

## 2018-01-21 RX ORDER — ONDANSETRON 4 MG/1
8 TABLET, FILM COATED ORAL 4 TIMES DAILY PRN
Status: DISCONTINUED | OUTPATIENT
Start: 2018-01-21 | End: 2018-01-21 | Stop reason: SDUPTHER

## 2018-01-21 RX ORDER — NALOXONE HYDROCHLORIDE 1 MG/ML
2 INJECTION INTRAMUSCULAR; INTRAVENOUS; SUBCUTANEOUS ONCE
Status: COMPLETED | OUTPATIENT
Start: 2018-01-21 | End: 2018-01-21

## 2018-01-21 RX ORDER — DEXTROSE MONOHYDRATE 50 MG/ML
100 INJECTION, SOLUTION INTRAVENOUS PRN
Status: DISCONTINUED | OUTPATIENT
Start: 2018-01-21 | End: 2018-01-22 | Stop reason: HOSPADM

## 2018-01-21 RX ORDER — BLOOD PRESSURE TEST KIT
1 KIT MISCELLANEOUS 3 TIMES DAILY
Status: DISCONTINUED | OUTPATIENT
Start: 2018-01-21 | End: 2018-01-21

## 2018-01-21 RX ORDER — NICOTINE POLACRILEX 4 MG
15 LOZENGE BUCCAL PRN
Status: DISCONTINUED | OUTPATIENT
Start: 2018-01-21 | End: 2018-01-22 | Stop reason: HOSPADM

## 2018-01-21 RX ORDER — ATORVASTATIN CALCIUM 20 MG/1
20 TABLET, FILM COATED ORAL NIGHTLY
Status: DISCONTINUED | OUTPATIENT
Start: 2018-01-21 | End: 2018-01-22 | Stop reason: HOSPADM

## 2018-01-21 RX ORDER — DEXTROSE MONOHYDRATE 25 G/50ML
12.5 INJECTION, SOLUTION INTRAVENOUS PRN
Status: DISCONTINUED | OUTPATIENT
Start: 2018-01-21 | End: 2018-01-22 | Stop reason: HOSPADM

## 2018-01-21 RX ORDER — ALLOPURINOL 300 MG/1
300 TABLET ORAL DAILY
Status: DISCONTINUED | OUTPATIENT
Start: 2018-01-21 | End: 2018-01-22 | Stop reason: HOSPADM

## 2018-01-21 RX ORDER — FAMOTIDINE 20 MG/1
20 TABLET, FILM COATED ORAL ONCE
Status: COMPLETED | OUTPATIENT
Start: 2018-01-21 | End: 2018-01-21

## 2018-01-21 RX ORDER — 0.9 % SODIUM CHLORIDE 0.9 %
1000 INTRAVENOUS SOLUTION INTRAVENOUS ONCE
Status: COMPLETED | OUTPATIENT
Start: 2018-01-21 | End: 2018-01-21

## 2018-01-21 RX ORDER — PANTOPRAZOLE SODIUM 40 MG/1
40 TABLET, DELAYED RELEASE ORAL
Status: DISCONTINUED | OUTPATIENT
Start: 2018-01-22 | End: 2018-01-22

## 2018-01-21 RX ORDER — ERGOCALCIFEROL 1.25 MG/1
50000 CAPSULE ORAL WEEKLY
Status: DISCONTINUED | OUTPATIENT
Start: 2018-01-21 | End: 2018-01-22 | Stop reason: HOSPADM

## 2018-01-21 RX ADMIN — ONDANSETRON 8 MG: 4 TABLET, ORALLY DISINTEGRATING ORAL at 11:24

## 2018-01-21 RX ADMIN — ONDANSETRON HYDROCHLORIDE 8 MG: 4 TABLET, FILM COATED ORAL at 09:04

## 2018-01-21 RX ADMIN — ERGOCALCIFEROL 50000 UNITS: 1.25 CAPSULE ORAL at 11:21

## 2018-01-21 RX ADMIN — ASPIRIN 81 MG: 81 TABLET, COATED ORAL at 11:20

## 2018-01-21 RX ADMIN — METFORMIN HYDROCHLORIDE 1000 MG: 500 TABLET, FILM COATED ORAL at 17:49

## 2018-01-21 RX ADMIN — FAMOTIDINE 20 MG: 20 TABLET ORAL at 21:37

## 2018-01-21 RX ADMIN — ONDANSETRON 4 MG: 2 INJECTION INTRAMUSCULAR; INTRAVENOUS at 04:01

## 2018-01-21 RX ADMIN — ONDANSETRON 4 MG: 2 INJECTION INTRAMUSCULAR; INTRAVENOUS at 00:24

## 2018-01-21 RX ADMIN — SODIUM CHLORIDE 1000 ML: 9 INJECTION, SOLUTION INTRAVENOUS at 00:24

## 2018-01-21 RX ADMIN — NALOXONE HYDROCHLORIDE 0.5 MG: 1 INJECTION PARENTERAL at 00:27

## 2018-01-21 RX ADMIN — ONDANSETRON 8 MG: 4 TABLET, ORALLY DISINTEGRATING ORAL at 17:49

## 2018-01-21 ASSESSMENT — ENCOUNTER SYMPTOMS
ABDOMINAL PAIN: 1
TROUBLE SWALLOWING: 0
SHORTNESS OF BREATH: 0
VOMITING: 1
NAUSEA: 1
SINUS PAIN: 0
SINUS PRESSURE: 0
BACK PAIN: 0
COUGH: 0
RHINORRHEA: 0

## 2018-01-21 ASSESSMENT — LIFESTYLE VARIABLES
HISTORY_ALCOHOL_USE: NO

## 2018-01-21 ASSESSMENT — SLEEP AND FATIGUE QUESTIONNAIRES
DO YOU USE A SLEEP AID: NO
DO YOU USE A SLEEP AID: NO
DO YOU HAVE DIFFICULTY SLEEPING: YES
SLEEP PATTERN: DIFFICULTY FALLING ASLEEP;DIFFICULTY ARISING;DISTURBED/INTERRUPTED SLEEP
AVERAGE NUMBER OF SLEEP HOURS: 6
DIFFICULTY FALLING ASLEEP: YES
RESTFUL SLEEP: NO
AVERAGE NUMBER OF SLEEP HOURS: 6
DIFFICULTY STAYING ASLEEP: YES
RESTFUL SLEEP: NO
SLEEP PATTERN: DIFFICULTY FALLING ASLEEP;DIFFICULTY ARISING;DISTURBED/INTERRUPTED SLEEP
DIFFICULTY ARISING: YES
DIFFICULTY STAYING ASLEEP: YES
DO YOU HAVE DIFFICULTY SLEEPING: YES
DIFFICULTY FALLING ASLEEP: YES
DIFFICULTY ARISING: YES

## 2018-01-21 NOTE — ED NOTES
BHI bed placement and voluntary consent forms faxed to [facility] hub. Pt assigned to [facility], bed 231. ED RN notified to give report.

## 2018-01-21 NOTE — PROGRESS NOTES

## 2018-01-21 NOTE — ED NOTES
Paged Dr. Zahra Hobson at [phone]. Second page went out at [phone]. Answering service stated they will keep paging him.

## 2018-01-21 NOTE — ED PROVIDER NOTES
[address] ED  eMERGENCY dEPARTMENT eNCOUnter   Attending Attestation     Pt Name: Aidee Cleaning  MRN: [id number]  [place] 1965  Date of evaluation: 1/21/18       Aidee Cleaning is a 48 y.o. male who presents with Drug Overdose      History:   Patient presents after estimating approximately Four-year 50 Percocets at 8:30 this evening. He fell asleep and woke up with difficulty breathing, contacted his soon to be  wife and son who then called EMS. Patient states that he feels very depressed due to the divorce that he is going through, denies psychiatric history. Patient misses some nausea vomiting, but no chest pain shortness breath fevers or chills. Social History     Tobacco History     Smoking Status  Never Smoker    Smokeless Tobacco Use  Never Used          Alcohol History     Alcohol Use Status  No          Drug Use     Drug Use Status  No          Sexual Activity     Sexually Active  Not Asked                No current facility-administered medications for this encounter. Current Outpatient Prescriptions   Medication Sig Dispense Refill    allopurinol (ZYLOPRIM) 300 MG tablet Take 1 tablet by mouth daily 90 tablet 3    empagliflozin (JARDIANCE) 25 MG tablet Take 25 mg by mouth daily 90 tablet 3    metFORMIN (GLUCOPHAGE) 1000 MG tablet Take 1 tablet by mouth 2 times daily (with meals) 180 tablet 3    D3-50 47221 units CAPS Take 1 capsule by mouth once a week  3    atorvastatin (LIPITOR) 10 MG tablet TAKE 1 TABLET BY MOUTH ONE TIME A DAY 30 tablet 10    ONE TOUCH LANCETS MISC 1 each by Does not apply route daily 100 each 3    glucose blood VI test strips (ASCENSIA AUTODISC VI;ONE TOUCH ULTRA TEST VI) strip Use with associated glucose meter. 100 strip 11    Alcohol Swabs PADS Apply 1 each topically 3 times daily 100 each 11    aspirin 81 MG tablet Take 81 mg by mouth daily.        Facility-Administered Medications Ordered in Other Encounters   Medication Dose Route Frequency Provider Last Rate Last Dose    0.9 % sodium chloride infusion   Intravenous Continuous Gean Dakin, MD        acetaminophen (TYLENOL) tablet 650 mg  650 mg Oral Q4H PRN Gean Dakin, MD        lactated ringers infusion   Intravenous Continuous Evan Robbins MD        insulin lispro (HUMALOG) injection vial 0-3 Units  0-3 Units Subcutaneous Nightly Evan Robbins MD        acetaminophen (TYLENOL) tablet 650 mg  650 mg Oral Q6H PRN Evan Robbins MD        ibuprofen (ADVIL;MOTRIN) tablet 800 mg  800 mg Oral Q6H PRN Evan Robbins MD        glucose (GLUTOSE) 40 % oral gel 15 g  15 g Oral PRN Evan Robbins MD        dextrose 50 % solution 12.5 g  12.5 g Intravenous PRN Evan Robbins MD        glucagon (rDNA) injection 1 mg  1 mg Intramuscular PRN Evan Robbins MD        dextrose 5 % solution  100 mL/hr Intravenous PRN Evan Robbins MD        insulin lispro (HUMALOG) injection vial 0-6 Units  0-6 Units Subcutaneous TID WC Evan Robbins MD           Past Medical History:   Diagnosis Date    Backache, unspecified     History of colon polyps 2016    Ileus ([address]     POST ABD OR    Infected seroma, postoperative     MRSA (methicillin resistant staph aureus) culture positive     Osteoarthritis     PONV (postoperative nausea and vomiting)     POSSIBLE SENSITIVE TO NARCOTICS    Pure hypercholesterolemia     Sleep apnea     Tubular adenoma of colon 07/2016    Type II or unspecified type diabetes mellitus without mention of complication, not stated as uncontrolled     Uric acid nephrolithiasis        Past Surgical History:   Procedure Laterality Date    CARDIAC CATHETERIZATION      negative    COLONOSCOPY  07/16/2016    tubular adenoma right colon    HAMMER TOE SURGERY Right     2ND TOE,     LIPECTOMY      LITHOTRIPSY      x 2     OTHER SURGICAL HISTORY  3/6/15    debridement of abdominal seroma    OTHER SURGICAL HISTORY  4-14-15    unsuccessful attempt

## 2018-01-21 NOTE — ED NOTES
Pt reports that he and his wife have been having problems since 1/9/18 and they have been going through a divorce. When this RN asked the pt if he felt safe at home and that if he felt like he would be safe at home if he were to go home, pt stated 'I don't care anymore, I just don't care anymore'.       Cintia Huerta RN  01/21/18 [id number]  [facility] Therese Angel RN  01/21/18 0046

## 2018-01-21 NOTE — BH NOTE
Writer spoke with patient 1:1 at length. Patient reports that he was never depressed or had any issues with mental health until this past Tuesday January 16th. 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 Patient states they have been together for 22 years and this caught him off guard as there had been no fighting or any issues between the two of them. His wife told him she had been feeling this way for a few years now, patient reports feeling betrayed and feeling \"fooled\". He complied with her request for divorce and has been working with her on the process, he states that she wants this to be over as soon as possible. Patient stated that his son has been very empathetic and supportive of him but his daughter has not been. When asked about what prompted his suicide attempt he stated that he had reached a point where he no longer felt like waking up every day and fighting through the work day - says that he has been nauseous, no appetite, poor sleep since her request for divorce and he felt there was no reason to wake up and continue fighting. Patient reported he took the remainder of his bottle of percocet from a surgery he had 2-3 years prior, was unsure of the amount but stated he had only taken one or two from the bottle and it was a 30 day supply to be taken multiple times a day. He went to bed that night after taking the medication and was woken up around midnight with shortness of breath and a sense of impending doom. Patient states he called for his wife who did not come to assist him, called for his son who did come to help him and ended up calling the ambulance for him.  Patient was tearful during 1:1, states he has never been depressed or felt suicidal before, he felt as though he could not continue to go through his day to day work and be

## 2018-01-21 NOTE — CONSULTS
results for input(s): LACTA, AMYLASE in the last 72 hours. S. Lactic Acid: No results for input(s): LACTA in the last 72 hours. Cardiac enzymes:No results for input(s): CKTOTAL, CKMB, CKMBINDEX, TROPONINI in the last 72 hours. BNP:No results for input(s): BNP in the last 72 hours. Lipid profile: No results for input(s): CHOL, TRIG, HDL, LDLCALC in the last 72 hours. Invalid input(s): LDL  Blood Gases: No results found for: PH, PCO2, PO2, HCO3, O2SAT  Thyroid functions:   Lab Results   Component Value Date    TSH 1.07 10/20/2017        Imaging/Diagonstics:      CXR: No acute cardiopulmonary findings. ASSESSMENT:    Patient Active Problem List   Diagnosis    Lumbago    Uric acid nephrolithiasis    Backache    Degenerative disc disease, cervical    History of colon polyps    Type 2 diabetes mellitus without complication, without long-term current use of insulin ([facility] [address]    Adjustment disorder with depressed mood    dm2   bs controlled   cont home meds     drug overdose   stable       PLAN:  Cont home meds   will follow      MD FAVIAN Dickens [address], [address] [place] [address].    Phone [phone]   Fax: [phone]  Answering Service: [phone]

## 2018-01-21 NOTE — ED PROVIDER NOTES
[address] ED  Emergency Department Encounter  Emergency Medicine Resident     Pt Name: Ana Masters  MRN: [id number]  [place] 1965  Date of evaluation: 1/21/18  PCP:  Lindsay Mcmanus MD    CHIEF COMPLAINT       Chief Complaint   Patient presents with    Drug Overdose       HISTORY OF PRESENT ILLNESS  (Location/Symptom, Timing/Onset, Context/Setting, Quality, Duration, Modifying Factors, Severity.)      Zarina Moe is a 48 y.o. male who presents with Intentional drug overdose. 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  He states that around 8:30 he took 40-50 Percocets. He states he was attempting to kill himself. He is going through a divorce and has been depressed and stressed lately. Patient states he woke up and spoke to his children and let them know what he did. They called EMS and EMS brought patient in. Upon arrival, patient is lethargic and vomiting. However he is oriented to person place and time. PAST MEDICAL / SURGICAL / SOCIAL / FAMILY HISTORY      has a past medical history of Backache, unspecified; History of colon polyps; Ileus ([address] Infected seroma, postoperative; MRSA (methicillin resistant staph aureus) culture positive; Osteoarthritis; PONV (postoperative nausea and vomiting); Pure hypercholesterolemia; Sleep apnea; Tubular adenoma of colon; Type II or unspecified type diabetes mellitus without mention of complication, not stated as uncontrolled; and Uric acid nephrolithiasis. has a past surgical history that includes Lithotripsy; Colonoscopy (07/16/2016); e-malignant / benign skin lesion excision (12/01/14); lipectomy; Refractive surgery (Bilateral); Cardiac catheterization; Hammer toe surgery (Right); other surgical history (3/6/15); other surgical history (4-14-15); other surgical history (4/24/2015); and other surgical history (Left, 3 31 16).     Social History     Social History    Marital status:      Spouse name: N/A   Litzy Dempsey chest pain. Gastrointestinal: Positive for abdominal pain, nausea and vomiting. Genitourinary: Negative for difficulty urinating. Musculoskeletal: Negative for back pain. Skin: Negative for pallor and rash. Neurological: Negative for weakness, numbness and headaches. Psychiatric/Behavioral: Positive for confusion, decreased concentration, self-injury and suicidal ideas. PHYSICAL EXAM   (up to 7 for level 4, 8 or more for level 5)      INITIAL VITALS:   /80   Pulse 68   Temp 97.6 °F (36.4 °C)   Resp 19   Wt 225 lb (102.1 kg)   SpO2 93%   BMI 30.48 kg/m²     Physical Exam   Constitutional: He is oriented to person, place, and time. He appears well-developed and well-nourished. No distress. HENT:   Head: Normocephalic and atraumatic. Mouth/Throat: No oropharyngeal exudate. Eyes: Pupils are equal, round, and reactive to light. Right eye exhibits no discharge. Neck: Normal range of motion. Cardiovascular: Normal rate, regular rhythm, normal heart sounds and intact distal pulses. No murmur heard. Pulmonary/Chest: Effort normal and breath sounds normal. No respiratory distress. He has no wheezes. He has no rales. Abdominal: Soft. He exhibits no distension. There is no tenderness. There is no rebound and no guarding. Musculoskeletal: Normal range of motion. He exhibits no edema. Neurological: He is alert and oriented to person, place, and time. No cranial nerve deficit. Coordination normal.   Skin: Skin is warm and dry. No rash noted. He is not diaphoretic. No erythema. Psychiatric: He has a normal mood and affect. His behavior is normal.   Nursing note and vitals reviewed.       DIFFERENTIAL  DIAGNOSIS     PLAN (LABS / IMAGING / EKG):  Orders Placed This Encounter   Procedures    CBC Auto Differential    Basic Metabolic Panel    TOX SCR, BLD, ED    Troponin    Urine Drug Screen    Urinalysis    Hepatic Function Panel    Drug screen, tricyclic    EKG 12 Lead    mmol/L    GFR Non-African American >60 >60 mL/min    GFR African American >60 >60 mL/min    GFR Comment          GFR Staging NOT REPORTED    TOX SCR, BLD, ED   Result Value Ref Range    Ethanol <10 <10 mg/dL    Ethanol percent <2.690 %    Salicylate Lvl <1 (L) 3 - 10 mg/dL    Acetaminophen Level 43 (H) 10 - 30 ug/mL   Troponin   Result Value Ref Range    Troponin T <0.03 <0.03 ng/mL    Troponin Interp         Hepatic Function Panel   Result Value Ref Range    Alb 4.2 3.5 - 5.2 g/dL    Alkaline Phosphatase 106 40 - 129 U/L    ALT 25 5 - 41 U/L    AST 20 <40 U/L    Total Bilirubin 0.42 0.3 - 1.2 mg/dL    Bilirubin, Direct 0.14 <0.31 mg/dL    Bilirubin, Indirect 0.28 0.00 - 1.00 mg/dL    Total Protein 7.4 6.4 - 8.3 g/dL    Globulin NOT REPORTED 1.5 - 3.8 g/dL    Albumin/Globulin Ratio NOT REPORTED 1.0 - 2.5   EKG 12 Lead   Result Value Ref Range    Ventricular Rate 67 BPM    Atrial Rate 67 BPM    P-R Interval 164 ms    QRS Duration 110 ms    Q-T Interval 408 ms    QTc Calculation (Bazett) 431 ms    P Axis 39 degrees    R Axis -21 degrees    T Axis 22 degrees       IMPRESSION: 60-year-old male presents after attempted overdose with 40-50 Percocets. Patient has pinpoint pupils, however is awake and responding following commands. He is oriented to person place time and situation. He states he was going through divorce and has been depressed. He took the pills at 8:30 PM, approximately 4 hours ago. We'll get Tylenol level, give him a small dose of Narcan, IV fluids, EKG. Patient likely will be admitted or maybe transferred to [facility]. [place].     RADIOLOGY:  None    EKG  EKG Interpretation    Interpreted by me    Rhythm: normal sinus   Rate: normal  Axis: normal  Ectopy: none  Conduction: normal  ST Segments: no acute change  T Waves: no acute change  Q Waves: none    Clinical Impression: no acute changes and normal EKG    All EKG's are interpreted by the Emergency Department Physician who either signs or Co-signs this

## 2018-01-22 VITALS
DIASTOLIC BLOOD PRESSURE: 67 MMHG | SYSTOLIC BLOOD PRESSURE: 116 MMHG | HEART RATE: 79 BPM | WEIGHT: 225 LBS | TEMPERATURE: 97.8 F | OXYGEN SATURATION: 99 % | RESPIRATION RATE: 14 BRPM | HEIGHT: 72 IN | BODY MASS INDEX: 30.48 KG/M2

## 2018-01-22 PROCEDURE — 5130000000 HC BRIDGE APPOINTMENT

## 2018-01-22 PROCEDURE — 6370000000 HC RX 637 (ALT 250 FOR IP): Performed by: INTERNAL MEDICINE

## 2018-01-22 PROCEDURE — 6370000000 HC RX 637 (ALT 250 FOR IP): Performed by: PSYCHIATRY & NEUROLOGY

## 2018-01-22 RX ORDER — PANTOPRAZOLE SODIUM 40 MG/1
40 TABLET, DELAYED RELEASE ORAL
Status: DISCONTINUED | OUTPATIENT
Start: 2018-01-22 | End: 2018-01-22 | Stop reason: HOSPADM

## 2018-01-22 RX ORDER — ATORVASTATIN CALCIUM 20 MG/1
20 TABLET, FILM COATED ORAL DAILY
Qty: 1 TABLET | Refills: 0
Start: 2018-01-22 | End: 2018-08-02 | Stop reason: SDUPTHER

## 2018-01-22 RX ADMIN — ALLOPURINOL 300 MG: 300 TABLET ORAL at 08:29

## 2018-01-22 RX ADMIN — METFORMIN HYDROCHLORIDE 1000 MG: 500 TABLET, FILM COATED ORAL at 17:39

## 2018-01-22 RX ADMIN — ASPIRIN 81 MG: 81 TABLET, COATED ORAL at 08:29

## 2018-01-22 RX ADMIN — PANTOPRAZOLE SODIUM 40 MG: 40 TABLET, DELAYED RELEASE ORAL at 08:29

## 2018-01-22 RX ADMIN — METFORMIN HYDROCHLORIDE 1000 MG: 500 TABLET, FILM COATED ORAL at 08:29

## 2018-01-22 RX ADMIN — CANAGLIFLOZIN 300 MG: 300 TABLET, FILM COATED ORAL at 14:27

## 2018-01-22 NOTE — PLAN OF CARE
Problem: Depressive Behavior with or without Suicide precautions  Goal: LTG-Able to verbalize acceptance of life and situations over which he or she has no control  Outcome: Ongoing  Pt  Discusses on 1:1 how his wife asking for the divorce took him off guard and he is no longer suicidal Seclusive to his room, nauseated MD called- pepcid ordered.    During this 1:1 pt did not discuss future , but only to say he is no longer suicidal  Goal: STG-Able to verbalize suicidal ideations  Outcome: Ongoing  Denies SI safe on unit  Goal: STG-Knowledge of positive coping patterns  Outcome: Ongoing  Did not discuss positive coping skills during this shift

## 2018-01-22 NOTE — FLOWSHEET NOTE
PATIENT ATTENDED SPIRITUALITY GROUP.       01/22/18 1417   Encounter Summary   Services provided to: Patient   Continue Visiting (1/22/18)   Complexity of Encounter Moderate   Length of Encounter 30 minutes   Spiritual/Voodoo   Type Spiritual support

## 2018-01-22 NOTE — PLAN OF CARE
Problem: Depressive Behavior with or without Suicide precautions  Goal: LTG-Able to verbalize acceptance of life and situations over which he or she has no control  Outcome: Ongoing  [address]  Day 3 Interdisciplinary Treatment Plan NOTE    Review Date & Time: 1/22/2018 1311    Admission Type:   Admission Type: Voluntary    Reason for admission:  Reason for Admission: Voluntary from [facility]. Attempted overdose on 40-60 Percocet. Currently going through a divorce. Hopeless/helpless. Voluntary signed only, patient very nauseous and vomiting on admission - Zofran 8mg 4xdaily PRN ordered. Cooperative, says he will allow assessments/sign papers later.    Estimated Length of Stay: 5-7 days  Estimated Discharge Date Update: to be determined by physician    PATIENT STRENGTHS:  Patient Strengths Strengths: Communication, Employment, Social Skills  Patient Strengths and Limitations:Limitations: Tendency to isolate self, Hopeless about future, Inappropriate/potentially harmful leisure interests, Lacks leisure interests  Addictive Behavior:Addictive Behavior  In the past 3 months, have you felt or has someone told you that you have a problem with:  : None  Do you have a history of Chemical Use?: No  Do you have a history of Alcohol Use?: No  Do you have a history of Street Drug Abuse?: No  Histroy of Prescripton Drug Abuse?: No  Medical Problems:  Past Medical History:   Diagnosis Date    Backache, unspecified     History of colon polyps 2016    Ileus ([address]     POST ABD OR    Infected seroma, postoperative     MRSA (methicillin resistant staph aureus) culture positive     Osteoarthritis     PONV (postoperative nausea and vomiting)     POSSIBLE SENSITIVE TO NARCOTICS    Pure hypercholesterolemia     Sleep apnea     Tubular adenoma of colon 07/2016    Type II or unspecified type diabetes mellitus without mention of complication, not stated as uncontrolled     Uric acid nephrolithiasis

## 2018-01-25 NOTE — CARE COORDINATION
- Writer speaks with Conrad Evans from [facility] program and provide health insurance numbers provided by PT to include:  1. Delisa, SS#, group #[id number], and plan #6  2. Luther 2012.  Phone [phone]
BHI Biopsychosocial Assessment    Current Level of Psychosocial Functioning     Independent X  Dependent    Minimal Assist     Comments:    Psychosocial High Risk Factors (check all that apply)    Unable to obtain meds   Chronic illness/pain  X Diabetes  Substance abuse   Lack of Family Support X  Financial stress   Isolation   Inadequate Community Resources  Suicide attempt(s) X  Not taking medications   Victim of crime   Developmental Delay   Unable to manage personal needs    Age 72 or older   Homeless  No transportation   Readmission within 30 days  Unemployment  Traumatic Event-reports wife of 25 years, asked for a divorce on January 16, 2018    Psychiatric Advanced Directives: None reported    Family to Involve in Treatment: PT reports that his mother is alive and is living in [place], He reports some support from his two children. Sexual Orientation:  Heterosexual    Patient Strengths:  Income, insurance, adequate housing, no history of Psychiatric hospitalizxations or diagnosis. Patient Barriers:  Presents on admission with suicide attempt pf taking Percocet medication that was left over from a surgery that he had 2-3 years ago. Opiate Education Provided: Pt was presented with opiate addiction and relapse prevention. CMHC/mental health history: PT reports that he is not currently linked with  HC. He reports that this is his first psychiatric hospita;lization and first suicide attempt. He doesn't have any doumented Psychiatric history in the past.     Plan of Care   medication management, group/individual therapies, family meetings, psycho -education, treatment team meetings to assist with stabilization, referral to community resources. Initial Discharge Plan: PT reports that prior to admission he was living in his home in [place] with his wife of 22 years and his two children.  He reports that he has an apartment in [place] set up that will be ready this Thursday that he is planning on
Bridge Appointment completed: Reviewed Discharge Instructions with patient. Patient verbalizes understanding and agreement with the discharge plan using the teachback method.        Discharge Arrangements: Writer will be working on psychotherapists covered under [facility] and will contact him at [phone] notified: PT own guardian  Discharge destination/address: [address] [place] [address]  Transported by:  Griselda Marcus and Temo Cantu
DISCHARGE INFORMATION:  - Writer accesses PT record after discharge due to confirmation of outside psychotherapist for ongoing treatment to return call. - Writer speaks with PT on 1/24 at 1:30pm to check on how PT is coping with situation at home and if there is anything else writer can do to assist during this stressful time. Writer is writing two separate letters from Dr. Siva Russell for return to work for [address] also confirms PT will be seeing a psychotherapist, Prasanth Luna, [facility] [address] [place] [address], [place] [address] and phone number is [phone]. Writer lets PT know Dr. Gisel Be has nights and Sunday appointment availability and will be reaching out with PT today to confirm first appointment. - Writer contacts PT at [phone], PT cell phone number and confirms the above information.
Name: Diogo Tucker    : 1965    Discharge Date: 2018    Primary Auth/Cert #: [id number]    Discharge Medications:      Medication List      CHANGE how you take these medications    atorvastatin 20 MG tablet  Commonly known as:  LIPITOR  Take 1 tablet by mouth daily TAKE 1 TABLET BY MOUTH ONE TIME A DAY  What changed:  · medication strength  · how much to take  · how to take this  · when to take this  Notes to patient:  High cholesterol        CONTINUE taking these medications    allopurinol 300 MG tablet  Commonly known as:  ZYLOPRIM  Take 1 tablet by mouth daily  Notes to patient:  gout     aspirin 81 MG tablet  Notes to patient:  Prevention of stroke and heart attack     D3-50 82547 UNIT Caps  Generic drug:  vitamin D  Notes to patient:  wellness     empagliflozin 25 MG tablet  Commonly known as:  JARDIANCE  Take 25 mg by mouth daily  Notes to patient:  diabetes     INVOKANA 300 MG Tabs tablet  Generic drug:  canagliflozin  Notes to patient:  diabetes     metFORMIN 1000 MG tablet  Commonly known as:  GLUCOPHAGE  Take 1 tablet by mouth 2 times daily (with meals)  Notes to patient:  diabetes        STOP taking these medications    Alcohol Swabs Pads     glucose blood VI test strips strip  Commonly known as:  ASCENSIA AUTODISC VI;ONE TOUCH ULTRA TEST VI     ONE TOUCH LANCETS Misc           Where to Get Your Medications      Information about where to get these medications is not yet available    Ask your nurse or doctor about these medications  · atorvastatin 20 MG tablet         Follow Up Appointment: Christopher Guzman MD  [facility] [id number]  [phone]          Please discharge Zarina to home address:  [address] [place] [address]  Go on 2018  Please send Zarina home by Gini Quintero and Buddy minori    Social  Worker will contact Balaji Monique  Follow-up appointment will be made and Zarina will be called at [phone] with date and time on Tuesday, .   Call
Pt declined to attend psychotherapy at 1100 am despite encouragement. PT was offered 1:1 and accepted on this date.
will need. Writer let's PT know conversations will continue.  - Writer and PT talk about some community resources that can be helpful and PT states would like to see  here at [address] [place] [address]. PT states Gabi Lopez knows me and my wife. \"

## 2018-02-12 DIAGNOSIS — E11.9 TYPE 2 DIABETES MELLITUS WITHOUT COMPLICATION, WITHOUT LONG-TERM CURRENT USE OF INSULIN (HCC): ICD-10-CM

## 2018-02-12 RX ORDER — CITALOPRAM 20 MG/1
20 TABLET ORAL 2 TIMES DAILY
Qty: 180 TABLET | Refills: 3 | Status: ON HOLD | OUTPATIENT
Start: 2018-02-12 | End: 2019-03-16 | Stop reason: ALTCHOICE

## 2018-04-17 ENCOUNTER — TELEPHONE (OUTPATIENT)
Dept: INTERNAL MEDICINE CLINIC | Age: 53
End: 2018-04-17

## 2018-07-19 RX ORDER — ALLOPURINOL 300 MG/1
TABLET ORAL
Qty: 90 TABLET | Refills: 3 | OUTPATIENT
Start: 2018-07-19

## 2018-07-23 ENCOUNTER — TELEPHONE (OUTPATIENT)
Dept: INTERNAL MEDICINE CLINIC | Age: 53
End: 2018-07-23

## 2018-07-23 NOTE — TELEPHONE ENCOUNTER
Patient called wanting us to refill a medication for him. He was informed that we are unable to fill meds for him since he transferred out of the practice in April 2018. He said that his \"insurance changed\", so now hes a patient here again. Tried to explain that just because his insurance changes does not automatically mean that he is a patient here again. Spoke with Nicole Lara who said that the patient needs to make an appointment before meds will be refilled.     Appointment was made with Dr Suzy Sagastume per patient request.

## 2018-07-25 RX ORDER — ATORVASTATIN CALCIUM 10 MG/1
TABLET, FILM COATED ORAL
Qty: 90 TABLET | Refills: 9 | OUTPATIENT
Start: 2018-07-25

## 2018-07-25 RX ORDER — ERGOCALCIFEROL 1.25 MG/1
CAPSULE ORAL
Qty: 12 CAPSULE | Refills: 1 | OUTPATIENT
Start: 2018-07-25

## 2018-08-02 ENCOUNTER — OFFICE VISIT (OUTPATIENT)
Dept: INTERNAL MEDICINE CLINIC | Age: 53
End: 2018-08-02
Payer: COMMERCIAL

## 2018-08-02 VITALS
SYSTOLIC BLOOD PRESSURE: 118 MMHG | WEIGHT: 229 LBS | DIASTOLIC BLOOD PRESSURE: 80 MMHG | HEIGHT: 72 IN | BODY MASS INDEX: 31.02 KG/M2

## 2018-08-02 DIAGNOSIS — E11.9 TYPE 2 DIABETES MELLITUS WITHOUT COMPLICATION, WITHOUT LONG-TERM CURRENT USE OF INSULIN (HCC): ICD-10-CM

## 2018-08-02 DIAGNOSIS — E78.5 DYSLIPIDEMIA: ICD-10-CM

## 2018-08-02 DIAGNOSIS — Z87.442 HISTORY OF RENAL CALCULI: ICD-10-CM

## 2018-08-02 PROCEDURE — 99213 OFFICE O/P EST LOW 20 MIN: CPT | Performed by: INTERNAL MEDICINE

## 2018-08-02 RX ORDER — MELOXICAM 15 MG/1
15 TABLET ORAL DAILY
COMMUNITY
End: 2018-08-02 | Stop reason: SDUPTHER

## 2018-08-02 RX ORDER — ATORVASTATIN CALCIUM 20 MG/1
20 TABLET, FILM COATED ORAL DAILY
Qty: 90 TABLET | Refills: 3 | Status: SHIPPED | OUTPATIENT
Start: 2018-08-02 | End: 2019-07-13 | Stop reason: SDUPTHER

## 2018-08-02 RX ORDER — MELOXICAM 15 MG/1
15 TABLET ORAL DAILY
Qty: 90 TABLET | Refills: 3 | Status: SHIPPED | OUTPATIENT
Start: 2018-08-02

## 2018-08-02 RX ORDER — ALLOPURINOL 300 MG/1
300 TABLET ORAL DAILY
Qty: 90 TABLET | Refills: 3 | Status: SHIPPED | OUTPATIENT
Start: 2018-08-02 | End: 2019-09-18 | Stop reason: SDUPTHER

## 2018-08-02 ASSESSMENT — ENCOUNTER SYMPTOMS
TROUBLE SWALLOWING: 0
WHEEZING: 0
COLOR CHANGE: 0
COUGH: 0
BLOOD IN STOOL: 0
ABDOMINAL DISTENTION: 0
SHORTNESS OF BREATH: 0
DIARRHEA: 0
EYE DISCHARGE: 0
EYE PAIN: 0

## 2018-08-02 NOTE — PROGRESS NOTES
Past Surgical History:   Procedure Laterality Date    CARDIAC CATHETERIZATION      negative    COLONOSCOPY  07/16/2016    tubular adenoma right colon    HAMMER TOE SURGERY Right     2ND TOE,     LIPECTOMY      LITHOTRIPSY      x 2     OTHER SURGICAL HISTORY  3/6/15    debridement of abdominal seroma    OTHER SURGICAL HISTORY  4-14-15    unsuccessful attempt of GLORIA insertion to ABD.  OTHER SURGICAL HISTORY  4/24/2015    CT guided drain tube placement    OTHER SURGICAL HISTORY Left 3 31 16    excision cyst helix,removal of tonsil liths    PRE-MALIGNANT / BENIGN SKIN LESION EXCISION  12/01/14    CHEST    REFRACTIVE SURGERY Bilateral      Family History   Problem Relation Age of Onset    Diabetes Mother     Liver Cancer Father      Current Outpatient Prescriptions   Medication Sig Dispense Refill    meloxicam (MOBIC) 15 MG tablet Take 1 tablet by mouth daily 90 tablet 3    empagliflozin (JARDIANCE) 25 MG tablet Take 25 mg by mouth daily 90 tablet 3    atorvastatin (LIPITOR) 20 MG tablet Take 1 tablet by mouth daily TAKE 1 TABLET BY MOUTH ONE TIME A DAY 90 tablet 3    allopurinol (ZYLOPRIM) 300 MG tablet Take 1 tablet by mouth daily 90 tablet 3    metFORMIN (GLUCOPHAGE) 1000 MG tablet Take 1 tablet by mouth 2 times daily (with meals) 180 tablet 3    neomycin-polymyxin-dexamethasone (MAXITROL) 0.1 % ophthalmic suspension Place 1 drop into the left eye 4 times daily for 10 days 2 mL 2    aspirin 81 MG tablet Take 81 mg by mouth daily.  citalopram (CELEXA) 20 MG tablet Take 1 tablet by mouth 2 times daily 180 tablet 3    canagliflozin (INVOKANA) 300 MG TABS tablet Take 300 mg by mouth every morning (before breakfast)      D3-50 42566 units CAPS Take 1 capsule by mouth once a week  3     No current facility-administered medications for this visit.       Facility-Administered Medications Ordered in Other Visits   Medication Dose Route Frequency Provider Last Rate Last Dose    0.9 % sodium chloride infusion   Intravenous Continuous Sulema Pepe MD        acetaminophen (TYLENOL) tablet 650 mg  650 mg Oral Q4H PRN Sulema Pepe MD        lactated ringers infusion   Intravenous Continuous Ida Cota MD        insulin lispro (HUMALOG) injection vial 0-3 Units  0-3 Units Subcutaneous Nightly Ida Cota MD        acetaminophen (TYLENOL) tablet 650 mg  650 mg Oral Q6H PRN Ida Cota MD        ibuprofen (ADVIL;MOTRIN) tablet 800 mg  800 mg Oral Q6H PRN Ida Cota MD        insulin lispro (HUMALOG) injection vial 0-6 Units  0-6 Units Subcutaneous TID WC Ida Cota MD         ALLERGIES:    Allergies   Allergen Reactions    Doxycycline Hives    Fentanyl Other (See Comments)     DEVELOPED ILEUS    Lisinopril Other (See Comments)    Other      Chromium cat gut suture       Social History   Substance Use Topics    Smoking status: Never Smoker    Smokeless tobacco: Never Used    Alcohol use No      Body mass index is 31.06 kg/m².   /80   Ht 6' (1.829 m)   Wt 229 lb (103.9 kg)   BMI 31.06 kg/m²     Lab Results   Component Value Date     01/21/2018    K 3.9 01/21/2018    CL 99 01/21/2018    CO2 23 01/21/2018    BUN 19 01/21/2018    CREATININE 1.16 01/21/2018    GLUCOSE 156 01/21/2018    GLUCOSE 115 09/23/2011    CALCIUM 9.3 01/21/2018    PROT 7.4 01/21/2018    LABALBU 4.2 01/21/2018    LABALBU 4.7 09/23/2011    BILITOT 0.42 01/21/2018    ALKPHOS 106 01/21/2018    AST 20 01/21/2018    ALT 25 01/21/2018       Lab Results   Component Value Date    WBC 5.2 01/21/2018    RBC 5.42 01/21/2018    RBC 5.15 09/23/2011    HGB 15.0 01/21/2018    HCT 45.0 01/21/2018    MCV 83.1 01/21/2018    MCH 27.8 01/21/2018    MCHC 33.4 01/21/2018    RDW 13.0 01/21/2018     01/21/2018     09/23/2011    MPV 7.0 01/21/2018       Lab Results   Component Value Date    LABA1C 7.2 07/01/2017       Lab Results   Component Value Date    HDL 46 10/20/2017    LDLCHOLESTEROL 46 10/20/2017       Assessment:       Diagnosis Orders   1. Type 2 diabetes mellitus without complication, without long-term current use of insulin (HCC)  empagliflozin (JARDIANCE) 25 MG tablet    metFORMIN (GLUCOPHAGE) 1000 MG tablet    Lipid Panel    Hemoglobin A1C    Comprehensive Metabolic Panel   2. Dyslipidemia     3. History of renal calculi             Plan:      No Follow-up on file.   Orders Placed This Encounter   Procedures    Lipid Panel     Standing Status:   Future     Standing Expiration Date:   8/2/2019     Order Specific Question:   Is Patient Fasting?/# of Hours     Answer:   yes    Hemoglobin A1C     Standing Status:   Future     Standing Expiration Date:   8/2/2019    Comprehensive Metabolic Panel     Standing Status:   Future     Standing Expiration Date:   8/2/2019     Orders Placed This Encounter   Medications    meloxicam (MOBIC) 15 MG tablet     Sig: Take 1 tablet by mouth daily     Dispense:  90 tablet     Refill:  3    empagliflozin (JARDIANCE) 25 MG tablet     Sig: Take 25 mg by mouth daily     Dispense:  90 tablet     Refill:  3    atorvastatin (LIPITOR) 20 MG tablet     Sig: Take 1 tablet by mouth daily TAKE 1 TABLET BY MOUTH ONE TIME A DAY     Dispense:  90 tablet     Refill:  3    allopurinol (ZYLOPRIM) 300 MG tablet     Sig: Take 1 tablet by mouth daily     Dispense:  90 tablet     Refill:  3    metFORMIN (GLUCOPHAGE) 1000 MG tablet     Sig: Take 1 tablet by mouth 2 times daily (with meals)     Dispense:  180 tablet     Refill:  3    neomycin-polymyxin-dexamethasone (MAXITROL) 0.1 % ophthalmic suspension     Sig: Place 1 drop into the left eye 4 times daily for 10 days     Dispense:  2 mL     Refill:  2     Labs rev

## 2018-08-10 ENCOUNTER — HOSPITAL ENCOUNTER (OUTPATIENT)
Age: 53
Discharge: HOME | End: 2018-08-10
Payer: COMMERCIAL

## 2018-08-10 DIAGNOSIS — E11.9 TYPE 2 DIABETES MELLITUS WITHOUT COMPLICATION, WITHOUT LONG-TERM CURRENT USE OF INSULIN (HCC): ICD-10-CM

## 2018-08-10 LAB
ALBUMIN SERPL-MCNC: 4.4 G/DL (ref 3.5–5.2)
ALBUMIN/GLOBULIN RATIO: ABNORMAL (ref 1–2.5)
ALP BLD-CCNC: 104 U/L (ref 40–129)
ALT SERPL-CCNC: 17 U/L (ref 5–41)
ANION GAP SERPL CALCULATED.3IONS-SCNC: 13 MMOL/L (ref 9–17)
AST SERPL-CCNC: 17 U/L
BILIRUB SERPL-MCNC: 0.46 MG/DL (ref 0.3–1.2)
BUN BLDV-MCNC: 19 MG/DL (ref 6–20)
BUN/CREAT BLD: ABNORMAL (ref 9–20)
CALCIUM SERPL-MCNC: 9.5 MG/DL (ref 8.6–10.4)
CHLORIDE BLD-SCNC: 99 MMOL/L (ref 98–107)
CHOLESTEROL/HDL RATIO: 2.6
CHOLESTEROL: 142 MG/DL
CO2: 25 MMOL/L (ref 20–31)
CREAT SERPL-MCNC: 0.95 MG/DL (ref 0.7–1.2)
ESTIMATED AVERAGE GLUCOSE: 174 MG/DL
GFR AFRICAN AMERICAN: >60 ML/MIN
GFR NON-AFRICAN AMERICAN: >60 ML/MIN
GFR SERPL CREATININE-BSD FRML MDRD: ABNORMAL ML/MIN/{1.73_M2}
GFR SERPL CREATININE-BSD FRML MDRD: ABNORMAL ML/MIN/{1.73_M2}
GLUCOSE BLD-MCNC: 125 MG/DL (ref 70–99)
HBA1C MFR BLD: 7.7 % (ref 4–6)
HDLC SERPL-MCNC: 54 MG/DL
LDL CHOLESTEROL: 54 MG/DL (ref 0–130)
POTASSIUM SERPL-SCNC: 4.9 MMOL/L (ref 3.7–5.3)
SODIUM BLD-SCNC: 137 MMOL/L (ref 135–144)
TOTAL PROTEIN: 7.3 G/DL (ref 6.4–8.3)
TRIGL SERPL-MCNC: 172 MG/DL
VLDLC SERPL CALC-MCNC: ABNORMAL MG/DL (ref 1–30)

## 2018-08-10 PROCEDURE — 83036 HEMOGLOBIN GLYCOSYLATED A1C: CPT

## 2018-08-10 PROCEDURE — 80053 COMPREHEN METABOLIC PANEL: CPT

## 2018-08-10 PROCEDURE — 36415 COLL VENOUS BLD VENIPUNCTURE: CPT

## 2018-08-10 PROCEDURE — 80061 LIPID PANEL: CPT

## 2018-08-16 ENCOUNTER — TELEPHONE (OUTPATIENT)
Dept: INTERNAL MEDICINE CLINIC | Age: 53
End: 2018-08-16

## 2018-09-22 DIAGNOSIS — Z20.7 SCABIES EXPOSURE: Primary | ICD-10-CM

## 2018-09-22 RX ORDER — PERMETHRIN 50 MG/G
CREAM TOPICAL
Qty: 120 G | Refills: 0 | Status: ON HOLD | OUTPATIENT
Start: 2018-09-22 | End: 2019-03-16 | Stop reason: ALTCHOICE

## 2018-12-21 ENCOUNTER — TELEPHONE (OUTPATIENT)
Dept: INTERNAL MEDICINE CLINIC | Age: 53
End: 2018-12-21

## 2019-02-20 ENCOUNTER — HOSPITAL ENCOUNTER (OUTPATIENT)
Dept: PHYSICAL THERAPY | Age: 54
Setting detail: THERAPIES SERIES
Discharge: HOME | End: 2019-02-20
Payer: COMMERCIAL

## 2019-02-20 PROCEDURE — 97124 MASSAGE THERAPY: CPT

## 2019-02-20 PROCEDURE — 97161 PT EVAL LOW COMPLEX 20 MIN: CPT

## 2019-02-20 PROCEDURE — G0283 ELEC STIM OTHER THAN WOUND: HCPCS

## 2019-02-20 ASSESSMENT — PAIN DESCRIPTION - LOCATION: LOCATION: BACK

## 2019-02-20 ASSESSMENT — PAIN SCALES - GENERAL: PAINLEVEL_OUTOF10: 7

## 2019-02-20 ASSESSMENT — PAIN DESCRIPTION - ORIENTATION: ORIENTATION: UPPER;RIGHT

## 2019-02-20 ASSESSMENT — PAIN DESCRIPTION - DESCRIPTORS: DESCRIPTORS: CONSTANT;DULL;SHARP

## 2019-02-20 ASSESSMENT — PAIN DESCRIPTION - FREQUENCY: FREQUENCY: CONTINUOUS

## 2019-02-21 ENCOUNTER — OFFICE VISIT (OUTPATIENT)
Dept: INTERNAL MEDICINE CLINIC | Age: 54
End: 2019-02-21
Payer: COMMERCIAL

## 2019-02-21 VITALS
DIASTOLIC BLOOD PRESSURE: 60 MMHG | BODY MASS INDEX: 30.61 KG/M2 | HEIGHT: 72 IN | WEIGHT: 226 LBS | SYSTOLIC BLOOD PRESSURE: 100 MMHG

## 2019-02-21 DIAGNOSIS — E11.9 TYPE 2 DIABETES MELLITUS WITHOUT COMPLICATION, WITHOUT LONG-TERM CURRENT USE OF INSULIN (HCC): Primary | ICD-10-CM

## 2019-02-21 DIAGNOSIS — E78.5 DYSLIPIDEMIA: ICD-10-CM

## 2019-02-21 DIAGNOSIS — E66.09 CLASS 1 OBESITY DUE TO EXCESS CALORIES WITH SERIOUS COMORBIDITY AND BODY MASS INDEX (BMI) OF 30.0 TO 30.9 IN ADULT: ICD-10-CM

## 2019-02-21 DIAGNOSIS — M54.9 UPPER BACK PAIN: ICD-10-CM

## 2019-02-21 PROCEDURE — 99214 OFFICE O/P EST MOD 30 MIN: CPT | Performed by: INTERNAL MEDICINE

## 2019-02-21 RX ORDER — HYDROCODONE BITARTRATE AND ACETAMINOPHEN 5; 325 MG/1; MG/1
1 TABLET ORAL EVERY 8 HOURS PRN
Qty: 15 TABLET | Refills: 0 | Status: SHIPPED | OUTPATIENT
Start: 2019-02-21 | End: 2019-02-26

## 2019-02-21 RX ORDER — CYCLOBENZAPRINE HCL 10 MG
10 TABLET ORAL 2 TIMES DAILY PRN
COMMUNITY

## 2019-02-21 ASSESSMENT — PATIENT HEALTH QUESTIONNAIRE - PHQ9
SUM OF ALL RESPONSES TO PHQ QUESTIONS 1-9: 0
SUM OF ALL RESPONSES TO PHQ9 QUESTIONS 1 & 2: 0
2. FEELING DOWN, DEPRESSED OR HOPELESS: 0
1. LITTLE INTEREST OR PLEASURE IN DOING THINGS: 0
SUM OF ALL RESPONSES TO PHQ QUESTIONS 1-9: 0

## 2019-02-21 ASSESSMENT — ENCOUNTER SYMPTOMS
COUGH: 0
BLOOD IN STOOL: 0
WHEEZING: 0
DIARRHEA: 0
EYE DISCHARGE: 0
SHORTNESS OF BREATH: 0
EYE PAIN: 0
COLOR CHANGE: 0
ABDOMINAL DISTENTION: 0
TROUBLE SWALLOWING: 0
BACK PAIN: 1

## 2019-02-28 ENCOUNTER — HOSPITAL ENCOUNTER (OUTPATIENT)
Dept: PHYSICAL THERAPY | Age: 54
Setting detail: THERAPIES SERIES
Discharge: HOME | End: 2019-02-28
Payer: COMMERCIAL

## 2019-02-28 PROCEDURE — G0283 ELEC STIM OTHER THAN WOUND: HCPCS

## 2019-02-28 PROCEDURE — 97140 MANUAL THERAPY 1/> REGIONS: CPT

## 2019-02-28 ASSESSMENT — PAIN DESCRIPTION - FREQUENCY: FREQUENCY: CONTINUOUS

## 2019-02-28 ASSESSMENT — PAIN DESCRIPTION - DESCRIPTORS: DESCRIPTORS: CONSTANT;ACHING

## 2019-02-28 ASSESSMENT — PAIN DESCRIPTION - ORIENTATION: ORIENTATION: UPPER;RIGHT

## 2019-02-28 ASSESSMENT — PAIN DESCRIPTION - LOCATION: LOCATION: BACK;NECK

## 2019-02-28 ASSESSMENT — PAIN SCALES - GENERAL: PAINLEVEL_OUTOF10: 6

## 2019-03-04 ENCOUNTER — HOSPITAL ENCOUNTER (OUTPATIENT)
Dept: PHYSICAL THERAPY | Age: 54
Setting detail: THERAPIES SERIES
Discharge: HOME | End: 2019-03-04
Payer: COMMERCIAL

## 2019-03-04 PROCEDURE — 97140 MANUAL THERAPY 1/> REGIONS: CPT

## 2019-03-04 PROCEDURE — G0283 ELEC STIM OTHER THAN WOUND: HCPCS

## 2019-03-04 ASSESSMENT — PAIN DESCRIPTION - PROGRESSION: CLINICAL_PROGRESSION: NOT CHANGED

## 2019-03-04 ASSESSMENT — PAIN - FUNCTIONAL ASSESSMENT: PAIN_FUNCTIONAL_ASSESSMENT: PREVENTS OR INTERFERES SOME ACTIVE ACTIVITIES AND ADLS

## 2019-03-04 ASSESSMENT — PAIN DESCRIPTION - LOCATION: LOCATION: NECK

## 2019-03-04 ASSESSMENT — PAIN DESCRIPTION - DESCRIPTORS: DESCRIPTORS: ACHING;THROBBING

## 2019-03-04 ASSESSMENT — PAIN DESCRIPTION - FREQUENCY: FREQUENCY: CONTINUOUS

## 2019-03-04 ASSESSMENT — PAIN DESCRIPTION - ORIENTATION: ORIENTATION: RIGHT;POSTERIOR;PROXIMAL

## 2019-03-04 ASSESSMENT — PAIN DESCRIPTION - PAIN TYPE: TYPE: CHRONIC PAIN

## 2019-03-04 ASSESSMENT — PAIN SCALES - GENERAL: PAINLEVEL_OUTOF10: 5

## 2019-03-05 ENCOUNTER — TELEPHONE (OUTPATIENT)
Dept: GASTROENTEROLOGY | Age: 54
End: 2019-03-05

## 2019-03-06 ENCOUNTER — HOSPITAL ENCOUNTER (OUTPATIENT)
Dept: PHYSICAL THERAPY | Age: 54
Setting detail: THERAPIES SERIES
Discharge: HOME | End: 2019-03-06
Payer: COMMERCIAL

## 2019-03-06 DIAGNOSIS — D36.9 ADENOMATOUS POLYPS: Primary | ICD-10-CM

## 2019-03-06 PROCEDURE — 97140 MANUAL THERAPY 1/> REGIONS: CPT

## 2019-03-06 PROCEDURE — G0283 ELEC STIM OTHER THAN WOUND: HCPCS

## 2019-03-06 RX ORDER — POLYETHYLENE GLYCOL 3350 17 G/17G
POWDER, FOR SOLUTION ORAL
Qty: 255 G | Refills: 0 | Status: SHIPPED | OUTPATIENT
Start: 2019-03-06 | End: 2019-06-19

## 2019-03-06 ASSESSMENT — PAIN SCALES - GENERAL: PAINLEVEL_OUTOF10: 5

## 2019-03-06 ASSESSMENT — PAIN DESCRIPTION - PROGRESSION: CLINICAL_PROGRESSION: NOT CHANGED

## 2019-03-06 ASSESSMENT — PAIN DESCRIPTION - FREQUENCY: FREQUENCY: CONTINUOUS

## 2019-03-06 ASSESSMENT — PAIN DESCRIPTION - ORIENTATION: ORIENTATION: RIGHT

## 2019-03-06 ASSESSMENT — PAIN - FUNCTIONAL ASSESSMENT: PAIN_FUNCTIONAL_ASSESSMENT: PREVENTS OR INTERFERES SOME ACTIVE ACTIVITIES AND ADLS

## 2019-03-06 ASSESSMENT — PAIN DESCRIPTION - PAIN TYPE: TYPE: CHRONIC PAIN

## 2019-03-06 ASSESSMENT — PAIN DESCRIPTION - LOCATION: LOCATION: NECK

## 2019-03-06 ASSESSMENT — PAIN DESCRIPTION - DESCRIPTORS: DESCRIPTORS: ACHING;THROBBING

## 2019-03-11 ENCOUNTER — HOSPITAL ENCOUNTER (OUTPATIENT)
Dept: PHYSICAL THERAPY | Age: 54
Setting detail: THERAPIES SERIES
Discharge: HOME | End: 2019-03-11
Payer: COMMERCIAL

## 2019-03-11 PROCEDURE — 97124 MASSAGE THERAPY: CPT

## 2019-03-11 PROCEDURE — G0283 ELEC STIM OTHER THAN WOUND: HCPCS

## 2019-03-11 ASSESSMENT — PAIN SCALES - GENERAL: PAINLEVEL_OUTOF10: 5

## 2019-03-11 ASSESSMENT — PAIN DESCRIPTION - PAIN TYPE: TYPE: CHRONIC PAIN

## 2019-03-14 ENCOUNTER — HOSPITAL ENCOUNTER (OUTPATIENT)
Dept: PHYSICAL THERAPY | Age: 54
Setting detail: THERAPIES SERIES
Discharge: HOME | End: 2019-03-14
Payer: COMMERCIAL

## 2019-03-14 PROCEDURE — 97140 MANUAL THERAPY 1/> REGIONS: CPT

## 2019-03-14 PROCEDURE — G0283 ELEC STIM OTHER THAN WOUND: HCPCS

## 2019-03-14 ASSESSMENT — PAIN SCALES - GENERAL: PAINLEVEL_OUTOF10: 3

## 2019-03-14 ASSESSMENT — PAIN DESCRIPTION - ONSET: ONSET: PROGRESSIVE

## 2019-03-14 ASSESSMENT — PAIN DESCRIPTION - ORIENTATION: ORIENTATION: RIGHT;MID

## 2019-03-14 ASSESSMENT — PAIN DESCRIPTION - FREQUENCY: FREQUENCY: CONTINUOUS

## 2019-03-14 ASSESSMENT — PAIN DESCRIPTION - LOCATION: LOCATION: NECK

## 2019-03-14 ASSESSMENT — PAIN DESCRIPTION - PAIN TYPE: TYPE: CHRONIC PAIN

## 2019-03-14 ASSESSMENT — PAIN - FUNCTIONAL ASSESSMENT: PAIN_FUNCTIONAL_ASSESSMENT: PREVENTS OR INTERFERES SOME ACTIVE ACTIVITIES AND ADLS

## 2019-03-14 ASSESSMENT — PAIN DESCRIPTION - DIRECTION: RADIATING_TOWARDS: HEAD

## 2019-03-16 ENCOUNTER — HOSPITAL ENCOUNTER (OUTPATIENT)
Age: 54
Setting detail: OUTPATIENT SURGERY
Discharge: HOME | End: 2019-03-16
Attending: INTERNAL MEDICINE | Admitting: INTERNAL MEDICINE
Payer: COMMERCIAL

## 2019-03-16 VITALS
HEART RATE: 80 BPM | DIASTOLIC BLOOD PRESSURE: 78 MMHG | WEIGHT: 224 LBS | OXYGEN SATURATION: 99 % | SYSTOLIC BLOOD PRESSURE: 125 MMHG | RESPIRATION RATE: 16 BRPM | HEIGHT: 72 IN | BODY MASS INDEX: 30.34 KG/M2 | TEMPERATURE: 97.1 F

## 2019-03-16 LAB
GLUCOSE BLD-MCNC: 151 MG/DL (ref 75–110)
GLUCOSE BLD-MCNC: 163 MG/DL (ref 75–110)

## 2019-03-16 PROCEDURE — 82947 ASSAY GLUCOSE BLOOD QUANT: CPT

## 2019-03-16 PROCEDURE — 2580000003 HC RX 258: Performed by: INTERNAL MEDICINE

## 2019-03-16 PROCEDURE — 2709999900 HC NON-CHARGEABLE SUPPLY: Performed by: INTERNAL MEDICINE

## 2019-03-16 PROCEDURE — 7100000010 HC PHASE II RECOVERY - FIRST 15 MIN: Performed by: INTERNAL MEDICINE

## 2019-03-16 PROCEDURE — 88305 TISSUE EXAM BY PATHOLOGIST: CPT

## 2019-03-16 PROCEDURE — 99153 MOD SED SAME PHYS/QHP EA: CPT | Performed by: INTERNAL MEDICINE

## 2019-03-16 PROCEDURE — 99152 MOD SED SAME PHYS/QHP 5/>YRS: CPT | Performed by: INTERNAL MEDICINE

## 2019-03-16 PROCEDURE — 7100000011 HC PHASE II RECOVERY - ADDTL 15 MIN: Performed by: INTERNAL MEDICINE

## 2019-03-16 PROCEDURE — 3609010400 HC COLONOSCOPY POLYPECTOMY HOT BIOPSY: Performed by: INTERNAL MEDICINE

## 2019-03-16 PROCEDURE — 6360000002 HC RX W HCPCS: Performed by: INTERNAL MEDICINE

## 2019-03-16 PROCEDURE — C1773 RET DEV, INSERTABLE: HCPCS | Performed by: INTERNAL MEDICINE

## 2019-03-16 RX ORDER — MIDAZOLAM HYDROCHLORIDE 1 MG/ML
INJECTION INTRAMUSCULAR; INTRAVENOUS PRN
Status: DISCONTINUED | OUTPATIENT
Start: 2019-03-16 | End: 2019-03-16 | Stop reason: ALTCHOICE

## 2019-03-16 RX ORDER — SODIUM CHLORIDE 9 MG/ML
INJECTION, SOLUTION INTRAVENOUS CONTINUOUS
Status: DISCONTINUED | OUTPATIENT
Start: 2019-03-16 | End: 2019-03-16 | Stop reason: HOSPADM

## 2019-03-16 RX ORDER — FENTANYL CITRATE 50 UG/ML
INJECTION, SOLUTION INTRAMUSCULAR; INTRAVENOUS PRN
Status: DISCONTINUED | OUTPATIENT
Start: 2019-03-16 | End: 2019-03-16 | Stop reason: ALTCHOICE

## 2019-03-16 RX ADMIN — SODIUM CHLORIDE: 9 INJECTION, SOLUTION INTRAVENOUS at 08:22

## 2019-03-16 ASSESSMENT — PAIN DESCRIPTION - LOCATION: LOCATION: ABDOMEN

## 2019-03-16 ASSESSMENT — PAIN SCALES - GENERAL
PAINLEVEL_OUTOF10: 3
PAINLEVEL_OUTOF10: 0
PAINLEVEL_OUTOF10: 0

## 2019-03-16 ASSESSMENT — PAIN - FUNCTIONAL ASSESSMENT: PAIN_FUNCTIONAL_ASSESSMENT: 0-10

## 2019-03-16 ASSESSMENT — PAIN DESCRIPTION - PAIN TYPE: TYPE: SURGICAL PAIN

## 2019-03-16 ASSESSMENT — PAIN DESCRIPTION - FREQUENCY: FREQUENCY: INTERMITTENT

## 2019-03-16 ASSESSMENT — PAIN DESCRIPTION - DESCRIPTORS: DESCRIPTORS: CRAMPING

## 2019-03-18 RX ORDER — ERGOCALCIFEROL 1.25 MG/1
CAPSULE ORAL
Qty: 12 CAPSULE | Refills: 1 | Status: SHIPPED | OUTPATIENT
Start: 2019-03-18 | End: 2019-06-24 | Stop reason: SDUPTHER

## 2019-03-19 LAB — SURGICAL PATHOLOGY REPORT: NORMAL

## 2019-03-20 ENCOUNTER — HOSPITAL ENCOUNTER (OUTPATIENT)
Dept: PHYSICAL THERAPY | Age: 54
Setting detail: THERAPIES SERIES
Discharge: HOME | End: 2019-03-20
Payer: COMMERCIAL

## 2019-03-20 PROCEDURE — 97140 MANUAL THERAPY 1/> REGIONS: CPT

## 2019-03-20 PROCEDURE — 97032 APPL MODALITY 1+ESTIM EA 15: CPT

## 2019-03-20 PROCEDURE — G0283 ELEC STIM OTHER THAN WOUND: HCPCS

## 2019-03-20 ASSESSMENT — PAIN DESCRIPTION - ORIENTATION: ORIENTATION: RIGHT;MID

## 2019-03-20 ASSESSMENT — PAIN DESCRIPTION - LOCATION: LOCATION: NECK

## 2019-03-20 ASSESSMENT — PAIN DESCRIPTION - PAIN TYPE: TYPE: CHRONIC PAIN

## 2019-03-20 ASSESSMENT — PAIN SCALES - GENERAL: PAINLEVEL_OUTOF10: 4

## 2019-03-20 ASSESSMENT — PAIN DESCRIPTION - FREQUENCY: FREQUENCY: CONTINUOUS

## 2019-03-25 ENCOUNTER — HOSPITAL ENCOUNTER (OUTPATIENT)
Dept: PHYSICAL THERAPY | Age: 54
Setting detail: THERAPIES SERIES
Discharge: HOME | End: 2019-03-25
Payer: COMMERCIAL

## 2019-03-25 PROCEDURE — 97140 MANUAL THERAPY 1/> REGIONS: CPT

## 2019-03-25 PROCEDURE — G0283 ELEC STIM OTHER THAN WOUND: HCPCS

## 2019-03-25 ASSESSMENT — PAIN SCALES - GENERAL
PAINLEVEL_OUTOF10: 6
PAINLEVEL_OUTOF10: 6

## 2019-03-25 ASSESSMENT — PAIN DESCRIPTION - PAIN TYPE
TYPE: CHRONIC PAIN
TYPE: CHRONIC PAIN

## 2019-03-25 ASSESSMENT — PAIN DESCRIPTION - DESCRIPTORS
DESCRIPTORS: ACHING
DESCRIPTORS: ACHING

## 2019-03-25 ASSESSMENT — PAIN DESCRIPTION - ORIENTATION
ORIENTATION: RIGHT;PROXIMAL
ORIENTATION: RIGHT;PROXIMAL

## 2019-03-25 ASSESSMENT — PAIN DESCRIPTION - FREQUENCY
FREQUENCY: CONTINUOUS
FREQUENCY: CONTINUOUS

## 2019-03-25 ASSESSMENT — PAIN DESCRIPTION - LOCATION
LOCATION: NECK
LOCATION: NECK

## 2019-03-27 ENCOUNTER — HOSPITAL ENCOUNTER (OUTPATIENT)
Dept: PHYSICAL THERAPY | Age: 54
Setting detail: THERAPIES SERIES
Discharge: HOME | End: 2019-03-27
Payer: COMMERCIAL

## 2019-03-27 PROCEDURE — 97032 APPL MODALITY 1+ESTIM EA 15: CPT

## 2019-03-27 PROCEDURE — 97140 MANUAL THERAPY 1/> REGIONS: CPT

## 2019-03-27 ASSESSMENT — PAIN SCALES - GENERAL: PAINLEVEL_OUTOF10: 2

## 2019-03-27 ASSESSMENT — PAIN DESCRIPTION - ORIENTATION: ORIENTATION: RIGHT;PROXIMAL

## 2019-03-27 ASSESSMENT — PAIN DESCRIPTION - DESCRIPTORS: DESCRIPTORS: ACHING

## 2019-03-27 ASSESSMENT — PAIN DESCRIPTION - LOCATION: LOCATION: NECK

## 2019-03-27 ASSESSMENT — PAIN DESCRIPTION - PAIN TYPE: TYPE: CHRONIC PAIN

## 2019-03-27 ASSESSMENT — PAIN DESCRIPTION - FREQUENCY: FREQUENCY: CONTINUOUS

## 2019-04-01 ENCOUNTER — HOSPITAL ENCOUNTER (OUTPATIENT)
Dept: PHYSICAL THERAPY | Age: 54
Setting detail: THERAPIES SERIES
Discharge: HOME | End: 2019-04-01
Payer: COMMERCIAL

## 2019-04-01 PROCEDURE — 97140 MANUAL THERAPY 1/> REGIONS: CPT

## 2019-04-01 PROCEDURE — G0283 ELEC STIM OTHER THAN WOUND: HCPCS

## 2019-04-01 ASSESSMENT — PAIN DESCRIPTION - DESCRIPTORS: DESCRIPTORS: ACHING

## 2019-04-01 ASSESSMENT — PAIN DESCRIPTION - FREQUENCY: FREQUENCY: CONTINUOUS

## 2019-04-01 ASSESSMENT — PAIN DESCRIPTION - LOCATION: LOCATION: NECK

## 2019-04-01 ASSESSMENT — PAIN DESCRIPTION - PAIN TYPE: TYPE: CHRONIC PAIN

## 2019-04-01 ASSESSMENT — PAIN SCALES - GENERAL: PAINLEVEL_OUTOF10: 2

## 2019-04-01 ASSESSMENT — PAIN DESCRIPTION - ORIENTATION: ORIENTATION: RIGHT;ANTERIOR

## 2019-04-01 NOTE — PROGRESS NOTES
Physical Therapy  Daily Treatment Note  Date: 2019  Patient Name: Luis De La Cruz  MRN: [id number]     :   1965    Subjective:   General  Referring Practitioner: Dr Seth Kim/Dr Haresh Pena  PT Visit Information  Onset Date: 18  PT Insurance Information: MMO  Total # of Visits Approved: 12  Total # of Visits to Date: 11  No Show: 1  Canceled Appointment: 0  Subjective  Subjective: Good change in pain. Pain Screening  Patient Currently in Pain: Yes  Pain Assessment  Pain Assessment: 0-10  Pain Level: 2  Patient's Stated Pain Goal: No pain  Pain Type: Chronic pain  Pain Location: Neck  Pain Orientation: Right; Anterior  Pain Descriptors: Aching  Pain Frequency: Continuous  Multiple Pain Sites: No  Vital Signs  Patient Currently in Pain: Yes       Treatment Activities:   Manual therapy  Soft Tissue Mobalization: 15 min sitting chair               Ambulation  Ambulation?: Yes           Spine  Cervical: AROM CERVICAL FLEX 60 EXT 50 SBB 40 ROTB 60       Exercises  Exercise 1: Deep tissue massage to R mid trap 10'sitting  Exercise 2: Hot pack and EStim to R mid/lower trap 20 min sitting  Exercise 3: HEP-neck retraction, upper trap/lev scap stretch     Modalities  Moist heat: 15 min   E-Stim (parameters): 15 min   Manual therapy  Soft Tissue Mobalization: 15 min sitting chair                           Assessment:   Conditions Requiring Skilled Therapeutic Intervention  Body structures, Functions, Activity limitations: Decreased functional mobility ; Decreased ADL status; Decreased ROM; Increased Pain  Assessment: Making nice dodge in his pain. less symptoms all the time Progressnote on next visit. Treatment Diagnosis: Doing well overall, improving with less symptoms.    Prognosis: Good  Patient Education: Neck stretches  REQUIRES PT FOLLOW UP: Yes  Activity Tolerance  Activity Tolerance: Patient limited by pain    Goals:  Short term goals  Time Frame for Short term goals: 6 visits  Short term goal 1:

## 2019-04-03 ENCOUNTER — HOSPITAL ENCOUNTER (OUTPATIENT)
Dept: PHYSICAL THERAPY | Age: 54
Setting detail: THERAPIES SERIES
Discharge: HOME | End: 2019-04-03
Payer: COMMERCIAL

## 2019-04-03 PROCEDURE — G0283 ELEC STIM OTHER THAN WOUND: HCPCS

## 2019-04-03 PROCEDURE — 97140 MANUAL THERAPY 1/> REGIONS: CPT

## 2019-04-03 ASSESSMENT — PAIN DESCRIPTION - ORIENTATION: ORIENTATION: RIGHT;MID

## 2019-04-03 ASSESSMENT — PAIN DESCRIPTION - FREQUENCY: FREQUENCY: INTERMITTENT

## 2019-04-03 ASSESSMENT — PAIN DESCRIPTION - ONSET: ONSET: ON-GOING

## 2019-04-03 ASSESSMENT — PAIN DESCRIPTION - DESCRIPTORS: DESCRIPTORS: NUMBNESS

## 2019-04-03 ASSESSMENT — PAIN DESCRIPTION - PAIN TYPE: TYPE: CHRONIC PAIN

## 2019-04-03 ASSESSMENT — PAIN SCALES - GENERAL: PAINLEVEL_OUTOF10: 2

## 2019-04-03 ASSESSMENT — PAIN DESCRIPTION - LOCATION: LOCATION: NECK

## 2019-04-03 ASSESSMENT — PAIN DESCRIPTION - PROGRESSION: CLINICAL_PROGRESSION: NOT CHANGED

## 2019-04-03 ASSESSMENT — PAIN - FUNCTIONAL ASSESSMENT: PAIN_FUNCTIONAL_ASSESSMENT: PREVENTS OR INTERFERES SOME ACTIVE ACTIVITIES AND ADLS

## 2019-04-03 NOTE — PROGRESS NOTES
Physical Therapy  Update Note  Date: 4/3/2019  Patient Name: Jany Alan  MRN: [id number]     :   1965    Subjective:   General  Referring Practitioner: Dr Ajay Kim/Dr Dwight Ernandez  PT Visit Information  Onset Date: 18  PT Insurance Information: MMO  Total # of Visits Approved: 12  Total # of Visits to Date: 12  No Show: 1  Canceled Appointment: 0  Subjective  Subjective: Oveall I feel like I'm making progress. I have less pain. I do have an MRI tomorrow. So we will have a better idea of what the problem is. Pain Screening  Patient Currently in Pain: Yes  Pain Assessment  Pain Assessment: 0-10  Pain Level: 2  Patient's Stated Pain Goal: No pain  Pain Type: Chronic pain  Pain Location: Neck  Pain Orientation: Right;Mid  Pain Radiating Towards: Right Arm and fingers   Pain Descriptors: Numbness  Pain Frequency: Intermittent  Pain Onset: On-going  Clinical Progression: Not changed  Functional Pain Assessment: Prevents or interferes some active activities and ADLs  Non-Pharmaceutical Pain Intervention(S): Ambulation/Increased Activity; Acupressure; Emotional support; Environmental changes; Heat applied;Massage; Shower;TENS  Response to Pain Intervention: None  Multiple Pain Sites: No  Vital Signs  Patient Currently in Pain: Yes       Treatment Activities:   Manual therapy  Soft Tissue Mobalization: 15 min sitting chair               Ambulation  Ambulation?: Yes        Balance  Posture: Fair  Spine  Cervical: AROM CERVICAL FLEX 60 EXT 70 SBB 55 ROTB 65       Exercises  Exercise 1: Deep tissue massage to R mid trap 10 'sitting  Exercise 2: Hot pack and EStim to R mid/lower trap 20 min sitting  Exercise 3: HEP-neck retraction, upper trap/lev scap stretch     Modalities  Moist heat: 15 min   E-Stim (parameters): 15 min   Manual therapy  Soft Tissue Mobalization: 15 min sitting chair          Assessment:   Conditions Requiring Skilled Therapeutic Intervention  Body structures, Functions, Activity limitations: Decreased functional mobility ; Decreased ADL status; Decreased ROM; Increased Pain  Assessment: His overall pain is reduced and his function also appears improved. I think he could benefit for additional treatments 2 x a week x 2-4 more weeks to abolish his symptoms. Treatment Diagnosis: Doing well overall, improving with less symptoms. Prognosis: Good  Patient Education: Neck stretches  REQUIRES PT FOLLOW UP: Yes  Discharge Recommendations: Home independently  Activity Tolerance  Activity Tolerance: Patient limited by pain             Goals:  Short term goals  Time Frame for Short term goals: 6 visits  Short term goal 1: Decrease R mid trap pain by 50% so patient can do ADL easier(Met )  Short term goal 2: Increase AROM cervical to full(Met )  Short term goal 3: Indep with HEP(Met)  Long term goals  Time Frame for Long term goals : 12 visits  Long term goal 1: Improve neck disability index score from 32% to 22% or better(Not Met )  Patient Goals   Patient goals : Stop the pain. (Not Met )    Plan:     Continue PT 2 x week   Timed Code Treatment Minutes: 30 Minutes  Total Treatment Time: 30  Frequency and duration of TX  Days: 2  Weeks: 6     Therapy Time   Individual Concurrent Group Co-treatment   Time In  1730         Time Out  1800         Minutes  30 total  15 min MRD  15 min  Estim          Timed Code Treatment Minutes: 30 Minutes      Rehab Potential:  [] Good  [x] Fair  [] Poor   Suggested Professional Referral:  [x] No  [] Yes:  Barriers to Goal Achievement:  [x] No  [] Yes:  Domestic Concerns:  [x] No  [] Yes:    Treatment Plan:  [x] Therapeutic Exercise    [x] Modalities:  [] Therapeutic Activity    [] Ultrasound  [x] Electrical Stimulation  [] Gait Training     [x] Massage       [] Lumbar/Cervical Traction  [x] Neuromuscular Re-education [x] Cold/hot pack [] Other:  [x] Instruction in HEP              [] Work Conditioning                                  [x] Manual Therapy [] Aquatic Therapy     [] Vasocompression  [] Iontophoresis: 4 mg/mL                      Dexamethasone Sodium      Phosphate 40-80mAmin (Allergies Reviewed)     Frequency:         2  X/wk x     4     wk's      [x] Plans/Goals, Risk/Benefits discussed with pt/family  Comprehension of Education [x] yes  [x] Needs Review  Pt/Family Education: [x] Verbal  [x] Demo  [] Written    More objective information is available upon request.  Thank you for this referral.        Medicare/Regulatory Requirements:  I have reviewed this plan of care and certify a need for   Medically necessary rehabilitation services.   [x] Physician Signature    Date:     Electronically signed by: [place] eileen, [address] @ [facility]  [address] [place] [address], [address] [facility]  Phone [phone]  Fax [phone]

## 2019-04-08 ENCOUNTER — HOSPITAL ENCOUNTER (OUTPATIENT)
Dept: PHYSICAL THERAPY | Age: 54
Setting detail: THERAPIES SERIES
Discharge: HOME | End: 2019-04-08
Payer: COMMERCIAL

## 2019-04-08 NOTE — PROGRESS NOTES
Physical Therapy  Cancllation  Note  Date: 2019  Patient Name: Kavitha Menendez  MRN: [id number]     :   1965    General  Referring Practitioner: Dr Latosha Kim/Dr Keaton Thurman  PT Visit Information  Onset Date: 18  PT Insurance Information: MMO  Total # of Visits Approved: 24  Total # of Visits to Date: 12  No Show: 1  Canceled Appointment: 1  General Comment  Comments: called cancelled              Lucy Fontenot, PT

## 2019-04-10 ENCOUNTER — HOSPITAL ENCOUNTER (OUTPATIENT)
Dept: PHYSICAL THERAPY | Age: 54
Setting detail: THERAPIES SERIES
Discharge: HOME | End: 2019-04-10
Payer: COMMERCIAL

## 2019-04-10 PROCEDURE — 97140 MANUAL THERAPY 1/> REGIONS: CPT

## 2019-04-10 PROCEDURE — G0283 ELEC STIM OTHER THAN WOUND: HCPCS

## 2019-04-10 ASSESSMENT — PAIN DESCRIPTION - ONSET: ONSET: PROGRESSIVE

## 2019-04-10 ASSESSMENT — PAIN SCALES - GENERAL: PAINLEVEL_OUTOF10: 2

## 2019-04-10 ASSESSMENT — PAIN - FUNCTIONAL ASSESSMENT: PAIN_FUNCTIONAL_ASSESSMENT: PREVENTS OR INTERFERES SOME ACTIVE ACTIVITIES AND ADLS

## 2019-04-10 ASSESSMENT — PAIN DESCRIPTION - LOCATION: LOCATION: NECK

## 2019-04-10 ASSESSMENT — PAIN DESCRIPTION - PROGRESSION: CLINICAL_PROGRESSION: GRADUALLY IMPROVING

## 2019-04-10 ASSESSMENT — PAIN DESCRIPTION - PAIN TYPE: TYPE: CHRONIC PAIN

## 2019-04-10 ASSESSMENT — PAIN DESCRIPTION - ORIENTATION: ORIENTATION: RIGHT;MID;PROXIMAL

## 2019-04-10 ASSESSMENT — PAIN DESCRIPTION - FREQUENCY: FREQUENCY: CONTINUOUS

## 2019-04-10 NOTE — PROGRESS NOTES
Physical Therapy  Daily Treatment Note  Date: 4/10/2019  Patient Name: Shar Quinn  MRN: [id number]     :   1965    Subjective:   General  Referring Practitioner: Dr Surjit Kim/Dr Ba Gurrola  PT Visit Information  Onset Date: 18  PT Insurance Information: MMO  Total # of Visits Approved: 24  Total # of Visits to Date: 13  No Show: 1  Canceled Appointment: 1  General Comment  Comments: Every thing is doing a little better. Less pain and more AROM. Pain Screening  Patient Currently in Pain: Yes  Pain Assessment  Pain Assessment: 0-10  Pain Level: 2  Patient's Stated Pain Goal: No pain  Pain Type: Chronic pain  Pain Location: Neck  Pain Orientation: Right;Mid;Proximal  Pain Radiating Towards: right arm and fingers  Pain Descriptors: Constant;Burning;Numbness; Sore  Pain Frequency: Continuous  Pain Onset: Progressive  Clinical Progression: Gradually improving  Functional Pain Assessment: Prevents or interferes some active activities and ADLs  Non-Pharmaceutical Pain Intervention(s): Acupuncture; Ambulation/Increased Activity;Cold applied;Distraction; Emotional support; Environmental changes; Heat applied;Massage;Relaxation techniques; Rest;Shower;TENS  Response to Pain Intervention: None  Vital Signs  Patient Currently in Pain: Yes       Treatment Activities:   Manual therapy  Soft Tissue Mobalization: 15 min sitting chair       Ambulation  Ambulation?: Yes        Balance  Posture: Fair  Spine  Cervical: AROM CERVICAL FLEX 60 EXT 70 SBB 55 ROTB 65       Exercises  Exercise 1: Deep tissue massage to R mid trap 10 'sitting  Exercise 2: Hot pack and EStim to R mid/lower trap 20 min sitting  Exercise 3: HEP-neck retraction, upper trap/lev scap stretch     Modalities  Moist heat: 15 min   E-Stim (parameters): 15 min   Manual therapy  Soft Tissue Mobalization: 15 min sitting chair          Assessment:   Conditions Requiring Skilled Therapeutic Intervention  Body structures, Functions, Activity limitations: Decreased functional mobility ; Decreased ADL status; Decreased ROM; Increased Pain  Assessment: Continues to have less pain and  improved function. Treatment Diagnosis: Doing well overall, improving with less symptoms. Prognosis: Good  Patient Education: Neck stretches  REQUIRES PT FOLLOW UP: Yes  Discharge Recommendations: Home independently  Activity Tolerance  Activity Tolerance: Patient limited by pain             Goals:  Short term goals  Time Frame for Short term goals: 6 visits  Short term goal 1: Decrease R mid trap pain by 50% so patient can do ADL easier(met )  Short term goal 2: Increase AROM cervical to full(met )  Short term goal 3: Indep with HEP(met)  Long term goals  Time Frame for Long term goals : 12 visits  Long term goal 1: Improve neck disability index score from 32% to 22% or better(not met )  Patient Goals   Patient goals : Stop the pain. (not met )    Plan:     continue   Timed Code Treatment Minutes: 30 Minutes  Total Treatment Time: 30  Frequency and duration of TX  Days: 2  Weeks: 6     Therapy Time   Individual Concurrent Group Co-treatment   Time In  `1615         Time Out  1645         Minutes  30 min   Total  15 min  MRD  15 min IFC + HP           Timed Code Treatment Minutes: [address] Charly Bernstein, PT

## 2019-04-16 ENCOUNTER — OFFICE VISIT (OUTPATIENT)
Dept: ORTHOPEDIC SURGERY | Age: 54
End: 2019-04-16
Payer: COMMERCIAL

## 2019-04-16 DIAGNOSIS — M54.2 NECK PAIN: Primary | ICD-10-CM

## 2019-04-16 DIAGNOSIS — M54.12 CERVICAL RADICULOPATHY AT C6: ICD-10-CM

## 2019-04-16 DIAGNOSIS — G95.89 CERVICAL SPINAL MASS (HCC): ICD-10-CM

## 2019-04-16 PROCEDURE — 99203 OFFICE O/P NEW LOW 30 MIN: CPT | Performed by: ORTHOPAEDIC SURGERY

## 2019-04-16 RX ORDER — DIAZEPAM 5 MG/1
TABLET ORAL
Qty: 1 TABLET | Refills: 0 | Status: SHIPPED | OUTPATIENT
Start: 2019-04-16 | End: 2019-04-26

## 2019-04-16 NOTE — PROGRESS NOTES
Subjective:      Patient ID: Eleazar Hyman is a 47 y.o. male. Neck Pain    This is a new problem. The current episode started more than 1 month ago. The problem occurs constantly. The problem has been unchanged. The pain is associated with nothing. The pain is present in the midline and left side. The quality of the pain is described as aching and burning. The pain is moderate. The symptoms are aggravated by position and twisting. The pain is worse during the day. Stiffness is present all day. Associated symptoms include numbness and tingling. Pertinent negatives include no headaches, weakness or weight loss. The treatment provided mild relief. She has some history of neck by radicular arm pain. This largely is been fairly well controlled over the last several years. Patient with a three-month history of C5 6 right radicular arm pain    I've seen the patient socially for this and recommended physical therapy as he was not having significant neurologic deficit . Patient has been through physical therapy he reports his helped his pain some but he is continuing to have pain. Pain is to his thumb to his long finger of the right hand. Patient gets relief of his arm in the abductor position. Review of Systems   Constitutional: Negative for weight loss. Musculoskeletal: Positive for neck pain. Neurological: Positive for tingling and numbness. Negative for weakness and headaches. All other systems reviewed and are negative. Objective:   Physical Exam   Constitutional: He is oriented to person, place, and time. He appears well-developed and well-nourished. HENT:   Head: Normocephalic and atraumatic. Eyes: Conjunctivae and EOM are normal.   Neck: Normal range of motion. Pulmonary/Chest: Effort normal. No respiratory distress. Neurological: He is alert and oriented to person, place, and time. He has normal strength. He displays normal reflexes.  He exhibits normal muscle tone. Coordination normal.   Normal gait   Skin: Skin is warm and dry. Psychiatric: His behavior is normal. Thought content normal.   Nursing note and vitals reviewed. Patient with dysesthesia mild to right thumb to long finger, positive with positive relief of pain with the arm in the abductor position, mild positive Spurling's    Diagnostic x-rays AP and lateral cervical spine obtained reviewed by myself in clinic today patient with a lucent well marginated region that appears to be in the right C5 6 lateral mass  Assessment:      Encounter Diagnoses   Name Primary?     Neck pain Yes    Cervical spinal mass (HCC)     Cervical radiculopathy at C6      3 month history of progressive right radicular arm pain    Some relief but incomplete following physical therapy    New x-ray demonstrates a lucency in the right C5 6 lateral mass this was not present on prior x-rays      Plan:      MRI cervical spine with and without contrast        Jose Luis Mclaughlin MD

## 2019-04-22 ENCOUNTER — OFFICE VISIT (OUTPATIENT)
Dept: INTERNAL MEDICINE CLINIC | Age: 54
End: 2019-04-22
Payer: COMMERCIAL

## 2019-04-22 VITALS
SYSTOLIC BLOOD PRESSURE: 112 MMHG | BODY MASS INDEX: 31.29 KG/M2 | WEIGHT: 231 LBS | HEIGHT: 72 IN | DIASTOLIC BLOOD PRESSURE: 70 MMHG

## 2019-04-22 DIAGNOSIS — E66.09 CLASS 1 OBESITY DUE TO EXCESS CALORIES WITH SERIOUS COMORBIDITY AND BODY MASS INDEX (BMI) OF 30.0 TO 30.9 IN ADULT: ICD-10-CM

## 2019-04-22 DIAGNOSIS — E11.9 TYPE 2 DIABETES MELLITUS WITHOUT COMPLICATION, WITHOUT LONG-TERM CURRENT USE OF INSULIN (HCC): ICD-10-CM

## 2019-04-22 DIAGNOSIS — E78.5 DYSLIPIDEMIA: ICD-10-CM

## 2019-04-22 DIAGNOSIS — R07.1 CHEST PAIN ON BREATHING: Primary | ICD-10-CM

## 2019-04-22 PROCEDURE — 99214 OFFICE O/P EST MOD 30 MIN: CPT | Performed by: INTERNAL MEDICINE

## 2019-04-22 ASSESSMENT — ENCOUNTER SYMPTOMS
SHORTNESS OF BREATH: 0
COLOR CHANGE: 0
COUGH: 0
EYE DISCHARGE: 0
ABDOMINAL DISTENTION: 0
TROUBLE SWALLOWING: 0
DIARRHEA: 0
WHEEZING: 0
EYE PAIN: 0
BLOOD IN STOOL: 0

## 2019-04-22 NOTE — PROGRESS NOTES
Chest Pain     left sided chest pain non cardiac     Mass     on ct scan ordered by dr Jolene Dejesus [facility]   Topic Date Due    Hepatitis C screen  1965    HIV screen  01/19/1980    Hepatitis B Vaccine (1 of 3 - Risk 3-dose series) 01/19/1984    Shingles Vaccine (1 of 2) 01/19/2015    Diabetic foot exam  03/02/2016    Diabetic microalbuminuria test  04/28/2018    A1C test (Diabetic or Prediabetic)  08/10/2019    Lipid screen  08/10/2019    Diabetic retinal exam  02/26/2020    Colon cancer screen colonoscopy  03/16/2024    DTaP/Tdap/Td vaccine (2 - Td) 08/27/2025    Flu vaccine  Completed    Pneumococcal 0-64 years Vaccine  Completed     Pt had abnormal cervical x ray        Review of Systems   Constitutional: Negative for appetite change, diaphoresis and fatigue. HENT: Negative for ear discharge and trouble swallowing. Eyes: Negative for pain and discharge. Respiratory: Negative for cough, shortness of breath and wheezing. Cardiovascular: Negative for chest pain and palpitations. Gastrointestinal: Negative for abdominal distention, blood in stool and diarrhea. Endocrine: Negative for polydipsia and polyphagia. Genitourinary: Negative for difficulty urinating and frequency. Musculoskeletal: Positive for arthralgias. Negative for gait problem, myalgias and neck pain. Neck pain with shotin in arm     Skin: Negative for color change and rash. Allergic/Immunologic: Negative for environmental allergies and food allergies. Neurological: Negative for dizziness and headaches. Hematological: Negative for adenopathy. Does not bruise/bleed easily. Psychiatric/Behavioral: Negative for behavioral problems and sleep disturbance. Objective:   Physical Exam   Constitutional: He is oriented to person, place, and time. He appears well-developed and well-nourished. HENT:   Head: Normocephalic and atraumatic.    Eyes: Conjunctivae and EOM are normal. Right eye exhibits no discharge. Left eye exhibits no discharge. Right conjunctiva is not injected. Left conjunctiva is not injected. Right eye exhibits normal extraocular motion. Left eye exhibits normal extraocular motion. Neck: Normal range of motion. Neck supple. No JVD present. No edema and no erythema present. No thyroid mass and no thyromegaly present. Cardiovascular: Normal rate and regular rhythm. Exam reveals no friction rub. No murmur heard. Pulmonary/Chest: Effort normal and breath sounds normal. No accessory muscle usage. No tachypnea and no bradypnea. No respiratory distress. He has no wheezes. He has no rales. Abdominal: Soft. Bowel sounds are normal. He exhibits no distension. There is no tenderness. There is no rebound. Musculoskeletal: Normal range of motion. He exhibits no edema or tenderness. Lymphadenopathy:        Head (right side): No submental and no submandibular adenopathy present. Head (left side): No submental and no submandibular adenopathy present. He has no cervical adenopathy. Neurological: He is alert and oriented to person, place, and time. He displays no atrophy. No cranial nerve deficit or sensory deficit. He exhibits normal muscle tone. Coordination normal.   Skin: Skin is warm. No bruising, no ecchymosis and no rash noted. He is not diaphoretic. No pallor. Psychiatric: He has a normal mood and affect. His behavior is normal. His mood appears not anxious. His affect is not angry. His speech is not slurred. He is not aggressive. Cognition and memory are not impaired. He expresses no homicidal ideation. I have personally reviewed and agree with the patient RANDY, NELLA and Dane Bruce [address] is a 47 y.o. male who presents today for follow up on his chronic medical conditions as noted below.   Kenzie Mays is c/o of   Chief Complaint   Patient presents with    Chest Pain     left sided chest pain non cardiac     Mass     on ct scan ordered by dr Isabel Mcgrath c5 area        Patient Active Problem List:     Lumbago     Uric acid nephrolithiasis     Backache     Degenerative disc disease, cervical     History of colon polyps     Type 2 diabetes mellitus without complication, without long-term current use of insulin (HCC)     Adjustment disorder with depressed mood     Dyslipidemia     History of renal calculi     Upper back pain     Class 1 obesity due to excess calories with serious comorbidity and body mass index (BMI) of 30.0 to 30.9 in adult     Past Medical History:   Diagnosis Date    Backache, unspecified     History of colon polyps 2016    Ileus ([address]     POST ABD OR    Infected seroma, postoperative     MRSA (methicillin resistant staph aureus) culture positive     Osteoarthritis     PONV (postoperative nausea and vomiting)     POSSIBLE SENSITIVE TO NARCOTICS    Pure hypercholesterolemia     Sleep apnea     Tubular adenoma of colon 07/2016    Type II or unspecified type diabetes mellitus without mention of complication, not stated as uncontrolled     Uric acid nephrolithiasis       Past Surgical History:   Procedure Laterality Date    CARDIAC CATHETERIZATION      negative    COLONOSCOPY  07/16/2016    tubular adenoma right colon    COLONOSCOPY N/A 3/16/2019    COLONOSCOPY POLYPECTOMY HOT BIOPSY SNARE AND PHOTOS performed by Jama Rosenberg MD at [address] [place] [address],[address] TOE SURGERY Right     2ND TOE,     LIPECTOMY      LITHOTRIPSY      x 2     OTHER SURGICAL HISTORY  3/6/15    debridement of abdominal seroma    OTHER SURGICAL HISTORY  4-14-15    unsuccessful attempt of GLORIA insertion to ABD.     OTHER SURGICAL HISTORY  4/24/2015    CT guided drain tube placement    OTHER SURGICAL HISTORY Left 3 31 16    excision cyst helix,removal of tonsil liths    PRE-MALIGNANT / BENIGN SKIN LESION EXCISION  12/01/14    CHEST    REFRACTIVE SURGERY Bilateral      Family History   Problem Relation Age of Onset    Diabetes Mother     Liver Cancer Father Current Outpatient Medications   Medication Sig Dispense Refill    diazepam (VALIUM) 5 MG tablet Take one tablet one hour before testing 1 tablet 0    vitamin D (ERGOCALCIFEROL) 62874 units CAPS capsule TAKE 1 CAPSULE BY MOUTH ONE TIME A WEEK  12 capsule 1    polyethylene glycol (MIRALAX) powder Use as directed by following your bowel prep instructions given by physician office 255 g 0    bisacodyl (BISACODYL) 5 MG EC tablet TAKE 4 TABS THE DAY BEFORE COLONOSCOPY AS DIRECTED ON BOWEL PREP INSTRUCTIONS GIVEN BY PHYSICIAN OFFICE 4 tablet 0    cyclobenzaprine (FLEXERIL) 10 MG tablet Take 10 mg by mouth 2 times daily as needed for Muscle spasms      canagliflozin (INVOKANA) 300 MG TABS tablet Take 1 tablet by mouth every morning (before breakfast) 90 tablet 1    meloxicam (MOBIC) 15 MG tablet Take 1 tablet by mouth daily 90 tablet 3    atorvastatin (LIPITOR) 20 MG tablet Take 1 tablet by mouth daily TAKE 1 TABLET BY MOUTH ONE TIME A DAY 90 tablet 3    allopurinol (ZYLOPRIM) 300 MG tablet Take 1 tablet by mouth daily 90 tablet 3    metFORMIN (GLUCOPHAGE) 1000 MG tablet Take 1 tablet by mouth 2 times daily (with meals) 180 tablet 3    D3-50 14236 units CAPS Take 1 capsule by mouth once a week  3    aspirin 81 MG tablet Take 81 mg by mouth daily. No current facility-administered medications for this visit.       Facility-Administered Medications Ordered in Other Visits   Medication Dose Route Frequency Provider Last Rate Last Dose    0.9 % sodium chloride infusion   Intravenous Continuous Taras Bateman MD        acetaminophen (TYLENOL) tablet 650 mg  650 mg Oral Q4H PRN Taras Bateman MD        lactated ringers infusion   Intravenous Continuous Cory Slade MD        insulin lispro (HUMALOG) injection vial 0-3 Units  0-3 Units Subcutaneous Nightly Cory Slade MD        acetaminophen (TYLENOL) tablet 650 mg  650 mg Oral Q6H PRN Cory Slade MD        ibuprofen (ADVIL;MOTRIN) Contrast     Standing Status:   Future     Standing Expiration Date:   4/22/2020     Order Specific Question:   Reason for exam:     Answer:   pelruitic chest apin left side iwth cervial spine mass rule out ca lung     No orders of the defined types were placed in this encounter.     Pt had abnormal cervical x ray   peluritc cp  Lytic lesin on cervial x ray  Pt seen dr Diamond Justin  Who ordered this xray  heidy do ct scan rule out lung pathology

## 2019-04-24 ENCOUNTER — HOSPITAL ENCOUNTER (OUTPATIENT)
Age: 54
Discharge: HOME | End: 2019-04-24
Payer: COMMERCIAL

## 2019-04-24 DIAGNOSIS — R93.89 ABNORMAL X-RAY: ICD-10-CM

## 2019-04-24 DIAGNOSIS — E11.9 TYPE 2 DIABETES MELLITUS WITHOUT COMPLICATION, WITHOUT LONG-TERM CURRENT USE OF INSULIN (HCC): ICD-10-CM

## 2019-04-24 DIAGNOSIS — E78.5 DYSLIPIDEMIA: ICD-10-CM

## 2019-04-24 DIAGNOSIS — R93.89 ABNORMAL X-RAY: Primary | ICD-10-CM

## 2019-04-24 LAB
ABSOLUTE EOS #: 0.07 K/UL (ref 0–0.4)
ABSOLUTE IMMATURE GRANULOCYTE: ABNORMAL K/UL (ref 0–0.3)
ABSOLUTE LYMPH #: 1.7 K/UL (ref 1–4.8)
ABSOLUTE MONO #: 0.1 K/UL (ref 0.1–1.3)
ALBUMIN SERPL-MCNC: 4.5 G/DL (ref 3.5–5.2)
ALBUMIN/GLOBULIN RATIO: ABNORMAL (ref 1–2.5)
ALP BLD-CCNC: 109 U/L (ref 40–129)
ALT SERPL-CCNC: 22 U/L (ref 5–41)
ANION GAP SERPL CALCULATED.3IONS-SCNC: 14 MMOL/L (ref 9–17)
AST SERPL-CCNC: 17 U/L
BASOPHILS # BLD: 0 % (ref 0–2)
BASOPHILS ABSOLUTE: 0 K/UL (ref 0–0.2)
BILIRUB SERPL-MCNC: 0.41 MG/DL (ref 0.3–1.2)
BUN BLDV-MCNC: 17 MG/DL (ref 6–20)
BUN/CREAT BLD: ABNORMAL (ref 9–20)
CALCIUM SERPL-MCNC: 9.1 MG/DL (ref 8.6–10.4)
CHLORIDE BLD-SCNC: 100 MMOL/L (ref 98–107)
CHOLESTEROL/HDL RATIO: 4
CHOLESTEROL: 160 MG/DL
CO2: 24 MMOL/L (ref 20–31)
CREAT SERPL-MCNC: 1.02 MG/DL (ref 0.7–1.2)
CREATININE URINE: 85 MG/DL (ref 39–259)
DIFFERENTIAL TYPE: ABNORMAL
EOSINOPHILS RELATIVE PERCENT: 2 % (ref 0–4)
ESTIMATED AVERAGE GLUCOSE: 252 MG/DL
GFR AFRICAN AMERICAN: >60 ML/MIN
GFR NON-AFRICAN AMERICAN: >60 ML/MIN
GFR SERPL CREATININE-BSD FRML MDRD: ABNORMAL ML/MIN/{1.73_M2}
GFR SERPL CREATININE-BSD FRML MDRD: ABNORMAL ML/MIN/{1.73_M2}
GLUCOSE BLD-MCNC: 221 MG/DL (ref 70–99)
HBA1C MFR BLD: 10.4 % (ref 4–6)
HCT VFR BLD CALC: 48.4 % (ref 41–53)
HDLC SERPL-MCNC: 40 MG/DL
HEMOGLOBIN: 15.9 G/DL (ref 13.5–17.5)
IMMATURE GRANULOCYTES: ABNORMAL %
LDL CHOLESTEROL: 68 MG/DL (ref 0–130)
LYMPHOCYTES # BLD: 50 % (ref 24–44)
MCH RBC QN AUTO: 28.2 PG (ref 26–34)
MCHC RBC AUTO-ENTMCNC: 32.9 G/DL (ref 31–37)
MCV RBC AUTO: 85.8 FL (ref 80–100)
MICROALBUMIN/CREAT 24H UR: <12 MG/L
MICROALBUMIN/CREAT UR-RTO: NORMAL MCG/MG CREAT
MONOCYTES # BLD: 3 % (ref 1–7)
MORPHOLOGY: ABNORMAL
NRBC AUTOMATED: ABNORMAL PER 100 WBC
PDW BLD-RTO: 13.5 % (ref 11.5–14.9)
PLATELET # BLD: 272 K/UL (ref 150–450)
PLATELET ESTIMATE: ABNORMAL
PMV BLD AUTO: 6.6 FL (ref 6–12)
POTASSIUM SERPL-SCNC: 4.9 MMOL/L (ref 3.7–5.3)
RBC # BLD: 5.64 M/UL (ref 4.5–5.9)
RBC # BLD: ABNORMAL 10*6/UL
SEG NEUTROPHILS: 45 % (ref 36–66)
SEGMENTED NEUTROPHILS ABSOLUTE COUNT: 1.53 K/UL (ref 1.3–9.1)
SODIUM BLD-SCNC: 138 MMOL/L (ref 135–144)
TOTAL PROTEIN: 7.2 G/DL (ref 6.4–8.3)
TRIGL SERPL-MCNC: 262 MG/DL
TSH SERPL DL<=0.05 MIU/L-ACNC: 0.66 MIU/L (ref 0.3–5)
VITAMIN D 25-HYDROXY: 22.7 NG/ML (ref 30–100)
VLDLC SERPL CALC-MCNC: ABNORMAL MG/DL (ref 1–30)
WBC # BLD: 3.4 K/UL (ref 3.5–11)
WBC # BLD: ABNORMAL 10*3/UL

## 2019-04-24 PROCEDURE — 84155 ASSAY OF PROTEIN SERUM: CPT

## 2019-04-24 PROCEDURE — 82306 VITAMIN D 25 HYDROXY: CPT

## 2019-04-24 PROCEDURE — 84443 ASSAY THYROID STIM HORMONE: CPT

## 2019-04-24 PROCEDURE — 85025 COMPLETE CBC W/AUTO DIFF WBC: CPT

## 2019-04-24 PROCEDURE — 82570 ASSAY OF URINE CREATININE: CPT

## 2019-04-24 PROCEDURE — 80061 LIPID PANEL: CPT

## 2019-04-24 PROCEDURE — 83036 HEMOGLOBIN GLYCOSYLATED A1C: CPT

## 2019-04-24 PROCEDURE — 36415 COLL VENOUS BLD VENIPUNCTURE: CPT

## 2019-04-24 PROCEDURE — 82043 UR ALBUMIN QUANTITATIVE: CPT

## 2019-04-24 PROCEDURE — 84165 PROTEIN E-PHORESIS SERUM: CPT

## 2019-04-24 PROCEDURE — 80053 COMPREHEN METABOLIC PANEL: CPT

## 2019-04-25 ENCOUNTER — HOSPITAL ENCOUNTER (OUTPATIENT)
Dept: MRI IMAGING | Age: 54
Discharge: HOME | End: 2019-04-27
Payer: COMMERCIAL

## 2019-04-25 ENCOUNTER — HOSPITAL ENCOUNTER (OUTPATIENT)
Dept: CT IMAGING | Age: 54
Discharge: HOME | End: 2019-04-27
Payer: COMMERCIAL

## 2019-04-25 DIAGNOSIS — R07.1 CHEST PAIN ON BREATHING: ICD-10-CM

## 2019-04-25 DIAGNOSIS — M54.2 NECK PAIN: ICD-10-CM

## 2019-04-25 PROCEDURE — 6360000004 HC RX CONTRAST MEDICATION: Performed by: INTERNAL MEDICINE

## 2019-04-25 PROCEDURE — 71260 CT THORAX DX C+: CPT

## 2019-04-25 PROCEDURE — 72156 MRI NECK SPINE W/O & W/DYE: CPT

## 2019-04-25 PROCEDURE — 2580000003 HC RX 258: Performed by: INTERNAL MEDICINE

## 2019-04-25 PROCEDURE — A9579 GAD-BASE MR CONTRAST NOS,1ML: HCPCS | Performed by: INTERNAL MEDICINE

## 2019-04-25 RX ORDER — SODIUM CHLORIDE 0.9 % (FLUSH) 0.9 %
10 SYRINGE (ML) INJECTION PRN
Status: DISCONTINUED | OUTPATIENT
Start: 2019-04-25 | End: 2019-04-28 | Stop reason: HOSPADM

## 2019-04-25 RX ORDER — 0.9 % SODIUM CHLORIDE 0.9 %
80 INTRAVENOUS SOLUTION INTRAVENOUS ONCE
Status: COMPLETED | OUTPATIENT
Start: 2019-04-25 | End: 2019-04-25

## 2019-04-25 RX ADMIN — GADOTERIDOL 20 ML: 279.3 INJECTION, SOLUTION INTRAVENOUS at 17:11

## 2019-04-25 RX ADMIN — Medication 10 ML: at 16:10

## 2019-04-25 RX ADMIN — SODIUM CHLORIDE 80 ML: 9 INJECTION, SOLUTION INTRAVENOUS at 16:10

## 2019-04-25 RX ADMIN — IOVERSOL 75 ML: 741 INJECTION INTRA-ARTERIAL; INTRAVENOUS at 16:10

## 2019-04-29 ENCOUNTER — HOSPITAL ENCOUNTER (OUTPATIENT)
Age: 54
Setting detail: OUTPATIENT SURGERY
Discharge: HOME | End: 2019-04-29
Attending: SURGERY | Admitting: SURGERY
Payer: COMMERCIAL

## 2019-04-29 VITALS
RESPIRATION RATE: 20 BRPM | TEMPERATURE: 97.5 F | SYSTOLIC BLOOD PRESSURE: 91 MMHG | HEIGHT: 72 IN | OXYGEN SATURATION: 94 % | HEART RATE: 77 BPM | DIASTOLIC BLOOD PRESSURE: 63 MMHG | BODY MASS INDEX: 30.48 KG/M2 | WEIGHT: 225 LBS

## 2019-04-29 LAB
GLUCOSE BLD-MCNC: 128 MG/DL (ref 75–110)
GLUCOSE BLD-MCNC: 162 MG/DL (ref 75–110)

## 2019-04-29 PROCEDURE — 7100000011 HC PHASE II RECOVERY - ADDTL 15 MIN: Performed by: SURGERY

## 2019-04-29 PROCEDURE — 2500000003 HC RX 250 WO HCPCS: Performed by: SURGERY

## 2019-04-29 PROCEDURE — 2709999900 HC NON-CHARGEABLE SUPPLY: Performed by: SURGERY

## 2019-04-29 PROCEDURE — 3600000002 HC SURGERY LEVEL 2 BASE: Performed by: SURGERY

## 2019-04-29 PROCEDURE — 7100000010 HC PHASE II RECOVERY - FIRST 15 MIN: Performed by: SURGERY

## 2019-04-29 PROCEDURE — 3600000012 HC SURGERY LEVEL 2 ADDTL 15MIN: Performed by: SURGERY

## 2019-04-29 PROCEDURE — 82947 ASSAY GLUCOSE BLOOD QUANT: CPT

## 2019-04-29 RX ORDER — BUPIVACAINE HYDROCHLORIDE AND EPINEPHRINE 5; 5 MG/ML; UG/ML
INJECTION, SOLUTION EPIDURAL; INTRACAUDAL; PERINEURAL PRN
Status: DISCONTINUED | OUTPATIENT
Start: 2019-04-29 | End: 2019-04-29 | Stop reason: ALTCHOICE

## 2019-04-29 RX ORDER — CLINDAMYCIN HYDROCHLORIDE 300 MG/1
600 CAPSULE ORAL 3 TIMES DAILY
Qty: 42 CAPSULE | Refills: 0 | Status: SHIPPED | OUTPATIENT
Start: 2019-04-29 | End: 2019-05-06

## 2019-04-29 ASSESSMENT — PAIN - FUNCTIONAL ASSESSMENT: PAIN_FUNCTIONAL_ASSESSMENT: 0-10

## 2019-04-29 ASSESSMENT — PAIN DESCRIPTION - DESCRIPTORS: DESCRIPTORS: ACHING

## 2019-04-29 ASSESSMENT — PAIN SCALES - GENERAL: PAINLEVEL_OUTOF10: 0

## 2019-04-29 NOTE — H&P
HISTORY and [id number]       NAME:  Farzaneh Waite  MRN: [id number]   YOB: 1965   Date: 4/29/2019   Age: 47 y.o. Gender: male       COMPLAINT AND PRESENT HISTORY:   Jae Frias is a 47 yr old male who has a retained suture in lower left abdominal  below an abdominal fold in the inguinal area, He will have it removed today, He has seepage from the area and has been able to see a tiny piece of retained suture, He has had this happen previously when he had a cyst removed,  He develops granular tissue if he had a retained suture and then a furuncle with redness and warmth and pain ,  He now has granular tissue and erythema and area is 2-2.5 cm around,     He has DM Glucose is 160,     PAST MEDICAL HISTORY     Past Medical History:   Diagnosis Date    Backache, unspecified     History of colon polyps 2016    Ileus ([address]     POST ABD OR    Infected seroma, postoperative     MRSA (methicillin resistant staph aureus) culture positive     Osteoarthritis     PONV (postoperative nausea and vomiting)     POSSIBLE SENSITIVE TO NARCOTICS    Pure hypercholesterolemia     Sleep apnea     Tubular adenoma of colon 07/2016    Type II or unspecified type diabetes mellitus without mention of complication, not stated as uncontrolled     Uric acid nephrolithiasis          SURGICAL HISTORY       Past Surgical History:   Procedure Laterality Date    CARDIAC CATHETERIZATION      negative    COLONOSCOPY  07/16/2016    tubular adenoma right colon    COLONOSCOPY N/A 3/16/2019    COLONOSCOPY POLYPECTOMY HOT BIOPSY SNARE AND PHOTOS performed by Chente Drummond MD at [facility] Right     2ND TOE,     LIPECTOMY      LITHOTRIPSY      x 2     OTHER SURGICAL HISTORY  3/6/15    debridement of abdominal seroma    OTHER SURGICAL HISTORY  4-14-15    unsuccessful attempt of GLORIA insertion to ABD.     OTHER SURGICAL HISTORY  4/24/2015    CT guided drain tube placement    OTHER SURGICAL HISTORY Left 3 31 16    excision cyst helix,removal of tonsil liths    PRE-MALIGNANT / BENIGN SKIN LESION EXCISION  12/01/14    CHEST    REFRACTIVE SURGERY Bilateral          FAMILY HISTORY     Family History   Problem Relation Age of Onset    Diabetes Mother     Liver Cancer Father          SOCIAL HISTORY       Social History     Socioeconomic History    Marital status:      Spouse name: Not on file    Number of children: Not on file    Years of education: Not on file    Highest education level: Not on file   Occupational History    Not on file   Social Needs    Financial resource strain: Not on file    Food insecurity:     Worry: Not on file     Inability: Not on file    Transportation needs:     Medical: Not on file     Non-medical: Not on file   Tobacco Use    Smoking status: Never Smoker    Smokeless tobacco: Never Used   Substance and Sexual Activity    Alcohol use: No    Drug use: No    Sexual activity: Not on file   Lifestyle    Physical activity:     Days per week: Not on file     Minutes per session: Not on file    Stress: Not on file   Relationships    Social connections:     Talks on phone: Not on file     Gets together: Not on file     Attends Restoration service: Not on file     Active member of club or organization: Not on file     Attends meetings of clubs or organizations: Not on file     Relationship status: Not on file    Intimate partner violence:     Fear of current or ex partner: Not on file     Emotionally abused: Not on file     Physically abused: Not on file     Forced sexual activity: Not on file   Other Topics Concern    Not on file   Social History Narrative    Not on file           REVIEW OF SYSTEMS      Allergies   Allergen Reactions    Doxycycline Hives    Fentanyl Other (See Comments)     DEVELOPED ILEUS    Lisinopril Other (See Comments)    Other      Chromium cat gut suture       Current Facility-Administered Medications on File Prior to Encounter   Medication Dose Route Frequency Provider Last Rate Last Dose    0.9 % sodium chloride infusion   Intravenous Continuous Mildred Adame MD        acetaminophen (TYLENOL) tablet 650 mg  650 mg Oral Q4H PRN Mildred Adame MD        lactated ringers infusion   Intravenous Continuous Dory Sheppard MD        insulin lispro (HUMALOG) injection vial 0-3 Units  0-3 Units Subcutaneous Nightly Dory Sheppard MD        acetaminophen (TYLENOL) tablet 650 mg  650 mg Oral Q6H PRN Dory Sheppard MD        ibuprofen (ADVIL;MOTRIN) tablet 800 mg  800 mg Oral Q6H PRN Dory Sheppard MD        insulin lispro (HUMALOG) injection vial 0-6 Units  0-6 Units Subcutaneous TID  Dory Sheppard MD         Current Outpatient Medications on File Prior to Encounter   Medication Sig Dispense Refill    vitamin D (ERGOCALCIFEROL) 21256 units CAPS capsule TAKE 1 CAPSULE BY MOUTH ONE TIME A WEEK  12 capsule 1    cyclobenzaprine (FLEXERIL) 10 MG tablet Take 10 mg by mouth 2 times daily as needed for Muscle spasms      canagliflozin (INVOKANA) 300 MG TABS tablet Take 1 tablet by mouth every morning (before breakfast) 90 tablet 1    meloxicam (MOBIC) 15 MG tablet Take 1 tablet by mouth daily 90 tablet 3    atorvastatin (LIPITOR) 20 MG tablet Take 1 tablet by mouth daily TAKE 1 TABLET BY MOUTH ONE TIME A DAY 90 tablet 3    allopurinol (ZYLOPRIM) 300 MG tablet Take 1 tablet by mouth daily 90 tablet 3    metFORMIN (GLUCOPHAGE) 1000 MG tablet Take 1 tablet by mouth 2 times daily (with meals) 180 tablet 3    D3-50 85951 units CAPS Take 1 capsule by mouth once a week  3    aspirin 81 MG tablet Take 81 mg by mouth daily.       polyethylene glycol (MIRALAX) powder Use as directed by following your bowel prep instructions given by physician office 255 g 0    bisacodyl (BISACODYL) 5 MG EC tablet TAKE 4 TABS THE DAY BEFORE COLONOSCOPY AS DIRECTED ON BOWEL PREP INSTRUCTIONS GIVEN BY PHYSICIAN OFFICE 4 tablet 0       Negative except for what is mentioned in the HPI. GENERAL PHYSICAL EXAM     Vitals: /73   Pulse 82   Temp 97.7 °F (36.5 °C) (Oral)   Resp 16   Ht 6' (1.829 m)   Wt 225 lb (102.1 kg)   SpO2 99%   BMI 30.52 kg/m²  Body mass index is 30.52 kg/m². GENERAL APPEARANCE:   Zarina Piper is 47 y.o.,  male, not obese, Alert Clear speech Good historian                             SKIN:  Warm, dry, no rashes or lesions. HEAD:  Normocephalic, Face symmetrical.                  EYES:  ESTEFANI EOMI and gaze in conjugate . EARS:  No  hearing loss. NOSE:  Septum midline, No epistaxis. THROAT:  No erythema of pharynx. No loose dentition                  NECK:  *Has no adenopathy. CHEST:  Symmetrical and equal on expansion. HEART:  HT regular and no murmer                 LUNGS:  Breath sounds are clear and no wheezes . ABDOMEN:   Abdomen is soft on palpation. Has a 3 cm area of erythema and induration with a 3mm linear center are of deeper erythema and seepage . LYMPHATICS:  No palpable cervical lymphadenopathy. LOCOMOTOR, BACK AND SPINE:  No tenderness or deformities. EXTREMITIES:  Good range of motion of upper and lower extremities, No pedal edema.   1+ dorsalis ped pulses, Grasp strength is 5/5     NEUROLOGIC:  Alert with clear speech, steady gait, and no weakness                       PROVISIONAL DIAGNOSES / SURGERY:      Skin soft tissue abcess in left groin area,   Patient Active Problem List    Diagnosis Date Noted    Upper back pain 02/21/2019    Class 1 obesity due to excess calories with serious comorbidity and body mass index (BMI) of 30.0 to 30.9 in adult 02/21/2019    Dyslipidemia 08/02/2018    History of renal calculi 08/02/2018    Adjustment disorder with depressed mood 01/21/2018    Type 2 diabetes mellitus without complication, without long-term current use of insulin ([facility] 09/07/2016    History of colon polyps 01/01/2016    Degenerative disc disease, cervical 11/06/2014    Uric acid nephrolithiasis     Backache     Lumbago 05/16/2013           MADDIE Collier - CNP on 4/29/2019 at 12:24 PM

## 2019-04-29 NOTE — DISCHARGE INSTR - DIET

## 2019-04-29 NOTE — OP NOTE
Operative Note      PATIENT NAME:  Jayden GARCES [facility]               MEDICAL RECORD NO. [id number]                DATE OF PROCEDURE:  4/29/2019  PRIMARY CARE PHYSICIAN:  Carly Campos MD  PREOPERATIVE DIAGNOSIS:  Left groin abscess  POSTOPERATIVE DIAGNOSIS:  Same  PROCEDURE PERFORMED:  Incision and drainage of left groin abscess  SURGEON:  Guerline Manzo DO, FACS  ANESTHESIA:  Local  BLOOD LOSS:  <5 ml. SPECIMENS:  None . COMPLICATIONS:  None immediately appreciated. INDICATION FOR PROCEDURE:  Zarina is a 47y.o. year old male who presented with signs and symptoms of left groin abscess. After history and physical examination was performed potential diagnostic and therapeutic modalities were discussed with the patient. Operative and nonoperative management was discussed. Risks, complications, benefits were reviewed. He was given the opportunity to ask questions. Once answered an informed consent was obtained. The patient was brought to the operating room on 4/29/2019. PROCEDURE:   The patient was taken to the operating room, placed on the operative table in the supine position. The operative site was prepped and draped in the usual sterile fashion. Time out was called. Local anesthetic was injected and a longitudinal incision was made. Scar and granulation tissue was encountered. No stitch abscess was seen. Some ingrown hairs were noted and removed. The area was irrigated an dried, then packed with 1/4 inch iodophor packing. Bulky dressing was applied. The patient was taken to recovery in stable condition. Instruments, needles, and sponges were counted twice at the end of the procedure and the counts were correct.

## 2019-05-02 ENCOUNTER — HOSPITAL ENCOUNTER (OUTPATIENT)
Dept: PHYSICAL THERAPY | Age: 54
Setting detail: THERAPIES SERIES
Discharge: HOME | End: 2019-05-02
Payer: COMMERCIAL

## 2019-05-02 LAB
ALBUMIN (CALCULATED): 4.9 G/DL (ref 3.2–5.2)
ALBUMIN PERCENT: 68 % (ref 45–65)
ALPHA 1 PERCENT: 2 % (ref 3–6)
ALPHA 2 PERCENT: 10 % (ref 6–13)
ALPHA-1-GLOBULIN: 0.2 G/DL (ref 0.1–0.4)
ALPHA-2-GLOBULIN: 0.8 G/DL (ref 0.5–0.9)
BETA GLOBULIN: 0.8 G/DL (ref 0.5–1.1)
BETA PERCENT: 11 % (ref 11–19)
GAMMA GLOBULIN %: 9 % (ref 9–20)
GAMMA GLOBULIN: 0.6 G/DL (ref 0.5–1.5)
PATHOLOGIST: ABNORMAL
PROTEIN ELECTROPHORESIS, SERUM: ABNORMAL
TOTAL PROT. SUM,%: 100 % (ref 98–102)
TOTAL PROT. SUM: 7.3 G/DL (ref 6.3–8.2)
TOTAL PROTEIN: ABNORMAL G/DL

## 2019-06-05 DIAGNOSIS — M54.2 NECK PAIN: Primary | ICD-10-CM

## 2019-06-05 DIAGNOSIS — M54.12 CERVICAL RADICULOPATHY AT C6: ICD-10-CM

## 2019-06-19 ENCOUNTER — HOSPITAL ENCOUNTER (OUTPATIENT)
Dept: PAIN MANAGEMENT | Age: 54
Discharge: HOME | End: 2019-06-19
Payer: COMMERCIAL

## 2019-06-19 VITALS
BODY MASS INDEX: 31.15 KG/M2 | HEIGHT: 72 IN | HEART RATE: 86 BPM | RESPIRATION RATE: 15 BRPM | OXYGEN SATURATION: 100 % | DIASTOLIC BLOOD PRESSURE: 76 MMHG | WEIGHT: 230 LBS | SYSTOLIC BLOOD PRESSURE: 134 MMHG

## 2019-06-19 DIAGNOSIS — M54.12 CERVICAL RADICULOPATHY: Primary | ICD-10-CM

## 2019-06-19 DIAGNOSIS — M47.22 OSTEOARTHRITIS OF SPINE WITH RADICULOPATHY, CERVICAL REGION: ICD-10-CM

## 2019-06-19 DIAGNOSIS — M50.30 DEGENERATIVE DISC DISEASE, CERVICAL: ICD-10-CM

## 2019-06-19 PROCEDURE — 99244 OFF/OP CNSLTJ NEW/EST MOD 40: CPT | Performed by: PAIN MEDICINE

## 2019-06-19 PROCEDURE — 99203 OFFICE O/P NEW LOW 30 MIN: CPT

## 2019-06-19 RX ORDER — SODIUM CHLORIDE, SODIUM LACTATE, POTASSIUM CHLORIDE, CALCIUM CHLORIDE 600; 310; 30; 20 MG/100ML; MG/100ML; MG/100ML; MG/100ML
75 INJECTION, SOLUTION INTRAVENOUS CONTINUOUS
Status: CANCELLED | OUTPATIENT
Start: 2019-06-19

## 2019-06-19 ASSESSMENT — ENCOUNTER SYMPTOMS
ALLERGIC/IMMUNOLOGIC NEGATIVE: 1
BLOOD IN STOOL: 0
VOMITING: 0
CONSTIPATION: 0
EYE REDNESS: 0
PHOTOPHOBIA: 0
RESPIRATORY NEGATIVE: 1
COUGH: 0
SHORTNESS OF BREATH: 0
EYES NEGATIVE: 1
DIARRHEA: 0
VISUAL CHANGE: 0
BACK PAIN: 1
NAUSEA: 0
EYE ITCHING: 0
GASTROINTESTINAL NEGATIVE: 1
ABDOMINAL PAIN: 0

## 2019-06-19 ASSESSMENT — PAIN - FUNCTIONAL ASSESSMENT: PAIN_FUNCTIONAL_ASSESSMENT: ACTIVITIES ARE NOT PREVENTED

## 2019-06-19 ASSESSMENT — PAIN DESCRIPTION - ONSET: ONSET: ON-GOING

## 2019-06-19 ASSESSMENT — PAIN DESCRIPTION - PAIN TYPE: TYPE: CHRONIC PAIN

## 2019-06-19 ASSESSMENT — PAIN DESCRIPTION - FREQUENCY: FREQUENCY: CONTINUOUS

## 2019-06-19 ASSESSMENT — PAIN DESCRIPTION - DESCRIPTORS: DESCRIPTORS: ACHING;CONSTANT;DULL;NAGGING;SHARP

## 2019-06-19 ASSESSMENT — PAIN SCALES - GENERAL: PAINLEVEL_OUTOF10: 5

## 2019-06-19 ASSESSMENT — PAIN DESCRIPTION - ORIENTATION: ORIENTATION: RIGHT

## 2019-06-19 ASSESSMENT — PAIN DESCRIPTION - PROGRESSION: CLINICAL_PROGRESSION: GRADUALLY WORSENING

## 2019-06-19 ASSESSMENT — PAIN DESCRIPTION - LOCATION: LOCATION: NECK;SHOULDER

## 2019-06-19 NOTE — PROGRESS NOTES
[facility]  Patient Pain Assessment  Consultation - Dr. Julienne Carbone    Primary Care Physician: Hafsa Denson MD    Chief complaint:   Chief Complaint   Patient presents with    Neck Pain   . HISTORY OF PRESENT ILLNESS:  Linda Kwong is 47 y.o. male to the pain clinic in consultation for back pain by Dr. Callie Diaz MD    Patient is a 59-year-old male with a history of pain in the cervical area and onset is somewhat insidious and is getting worse. The pain radiates at times to the right upper extremity. This is associated with tingling and numbness involving the index and ring and thumb on the right . Patient tried physical therapy which she did help the pain to a certain extent but he still cannot have uncomfortable pain which is interfering with the activities of daily living. Patient was evaluated by Dr. Lisa Harrison who recommended a trial of epidural steroid injections. Neck Pain    This is a chronic problem. Episode onset: 6 months. The problem occurs constantly. The problem has been unchanged. The pain is associated with nothing. The pain is present in the right side. The quality of the pain is described as aching (sharp). The pain is at a severity of 4/10. The pain is moderate. The symptoms are aggravated by twisting and position (liftiing.). The pain is same all the time. Stiffness is present in the morning. Associated symptoms include numbness and tingling. Pertinent negatives include no chest pain, headaches, photophobia, visual change, weakness or weight loss. Associated symptoms comments: Thumb index and middle fingers on the right side. RX Monitoring 6/19/2019   Periodic Controlled Substance Monitoring No signs of potential drug abuse or diversion identified. ;Random urine drug screen sent today.        Current Pain Assessment  Pain Assessment  Pain Assessment: 0-10  Pain Level: 5  Patient's Stated Pain Goal: 2  Pain Type: Chronic pain  Pain Location: Neck, Shoulder  Pain Orientation: Right  Pain Radiating Towards: toward right shoulder  Pain Descriptors: Aching, Constant, Dull, Nagging, Sharp  Pain Frequency: Continuous  Pain Onset: On-going  Clinical Progression: Gradually worsening  Functional Pain Assessment: Activities are not prevented  Non-Pharmaceutical Pain Intervention(s): Repositioned, Rest                    ADVERSE MEDICATION EFFECTS:   Constipation: not applicable  Bowel Regimen: No:   Diet: common adult  Appetite:  ok  Sedation:  no  Urinary Retention: no    FOCUSED PAIN SCALE:  Highest : 6  Lowest :2  Average: Range-4  When and What  was your last procedure:      Was your procedureeffective:  no    ACTIVITY/SOCIAL/EMOTIONAL:  Sleep Pattern: 4 hours per night. generally restful sleep  Energy Level: Normal  Currently attending Physical Therapy:  Yes completed in april  Home Exercises: daily stretches  Mobility: no current mobility problem   Do you use assistive devices? No  Have you fallen in the last 30 days?:  No  Currently seeing a Psychiatristor Psychologist:  No  Emotional Issues: normal   Mood: appropriate     ABERRANT BEHAVIORS SINCE LAST VISIT:  Have you ever been treated in another Pain Clinic no  Refills for prescriptions appropriate: not applicable  Lost rx/pills: not applicable  Taking more medication than prescribed:  not applicable  Are you receiving PAIN medications from  other doctors: yes [facility]  Last Urine/Serum Drug Screen :  Was Serum/UDS as anticipated?  not applicable  Brought pill bottles in :not applicable   Was Pill count appropriate? :not applicable   Are currently pregnant?  no  Recent ER visits: No             Past Medical History      Diagnosis Date    Backache, unspecified     History of colon polyps 2016    Ileus ([facility] [address]     POST ABD OR    Infected seroma, postoperative     MRSA (methicillin resistant staph aureus) culture positive     not + with two concesutiv eswabs    Osteoarthritis     PONV (postoperative mouth 2 times daily (with meals) 180 tablet 3    D3-50 48076 units CAPS Take 1 capsule by mouth once a week  3    aspirin 81 MG tablet Take 81 mg by mouth daily. No current facility-administered medications for this encounter. Facility-Administered Medications Ordered in Other Encounters   Medication Dose Route Frequency Provider Last Rate Last Dose    0.9 % sodium chloride infusion   Intravenous Continuous Kathy Angel MD        acetaminophen (TYLENOL) tablet 650 mg  650 mg Oral Q4H PRN Kathy Angel MD        lactated ringers infusion   Intravenous Continuous Winsome Vazquez MD        insulin lispro (HUMALOG) injection vial 0-3 Units  0-3 Units Subcutaneous Nightly Winsome Vazquez MD        acetaminophen (TYLENOL) tablet 650 mg  650 mg Oral Q6H PRN Winsome Vazquez MD        ibuprofen (ADVIL;MOTRIN) tablet 800 mg  800 mg Oral Q6H PRN Winsome Vazquez MD        insulin lispro (HUMALOG) injection vial 0-6 Units  0-6 Units Subcutaneous TID WC Winsome Vazquez MD           Allergies  Doxycycline; Fentanyl; Lisinopril; and Other    Family History  family history includes Diabetes in his mother; Liver Cancer in his father.     Social History  Social History     Socioeconomic History    Marital status:      Spouse name: None    Number of children: None    Years of education: None    Highest education level: None   Occupational History    None   Social Needs    Financial resource strain: None    Food insecurity:     Worry: None     Inability: None    Transportation needs:     Medical: None     Non-medical: None   Tobacco Use    Smoking status: Never Smoker    Smokeless tobacco: Never Used   Substance and Sexual Activity    Alcohol use: No    Drug use: No    Sexual activity: None   Lifestyle    Physical activity:     Days per week: None     Minutes per session: None    Stress: None   Relationships    Social connections:     Talks on phone: None     Gets together: None Attends [employer] service: None     Active member of club or organization: None     Attends meetings of clubs or organizations: None     Relationship status: None    Intimate partner violence:     Fear of current or ex partner: None     Emotionally abused: None     Physically abused: None     Forced sexual activity: None   Other Topics Concern    None   Social History Narrative    None      reports that he does not use drugs. REVIEW OF SYSTEMS:  Review of Systems   Constitutional: Negative. Negative for appetite change, diaphoresis, unexpected weight change and weight loss. HENT: Negative. Negative for congestion, dental problem and postnasal drip. Eyes: Negative. Negative for photophobia, redness and itching. Respiratory: Negative. Negative for cough and shortness of breath. Cardiovascular: Negative. Negative for chest pain and palpitations. Gastrointestinal: Negative. Negative for abdominal pain, blood in stool, constipation, diarrhea, nausea and vomiting. Endocrine: Negative. Negative for heat intolerance and polyuria. History of diabetes mellitus   Genitourinary: Negative. Negative for dysuria and hematuria. Musculoskeletal: Positive for back pain and neck pain. Skin: Negative. Negative for rash and wound. Allergic/Immunologic: Negative. Negative for immunocompromised state. Neurological: Positive for tingling and numbness. Negative for weakness and headaches. Hematological: Negative. Negative for adenopathy. Does not bruise/bleed easily. Psychiatric/Behavioral: Negative. Negative for suicidal ideas. The patient is not nervous/anxious. GENERAL PHYSICAL EXAM:  Vitals: /76   Pulse 86   Resp 15   Ht 6' (1.829 m)   Wt 230 lb (104.3 kg)   SpO2 100%   BMI 31.19 kg/m² , Body mass index is 31.19 kg/m². Physical Exam   Constitutional: He is oriented to person, place, and time. He appears well-developed and well-nourished.    Obese   HENT: Head: Normocephalic and atraumatic. Eyes: Pupils are equal, round, and reactive to light. Conjunctivae and EOM are normal.   Neck: Neck supple. No tracheal deviation present. No thyromegaly present. Cardiovascular: Normal rate and regular rhythm. Pulmonary/Chest: Effort normal and breath sounds normal.   Abdominal: Soft. Bowel sounds are normal. He exhibits no distension. There is no tenderness. Musculoskeletal: He exhibits no edema. Neurological: He is alert and oriented to person, place, and time. He has normal strength. He displays atrophy. No cranial nerve deficit or sensory deficit. He exhibits normal muscle tone. He displays a negative Romberg sign. He displays no seizure activity. Coordination normal.   Reflex Scores:       Tricep reflexes are 1+ on the right side. Bicep reflexes are 1+ on the right side and 1+ on the left side. Brachioradialis reflexes are 1+ on the right side and 1+ on the left side. Skin: Skin is warm and dry. Psychiatric: He has a normal mood and affect. His speech is normal and behavior is normal. Judgment and thought content normal. Cognition and memory are normal.   Vitals reviewed. Back Exam     Tenderness   The patient is experiencing tenderness in the cervical.    Range of Motion   Back extension: Somewhat painful and restricted. Back flexion: Somewhat painful and restricted. Back lateral bend right: Somewhat painful and restricted. Back lateral bend left: Somewhat painful and restricted. Back rotation right: Somewhat painful and restricted. Back rotation left: Somewhat painful and restricted.      Other   Sensation: normal  Gait: normal   Erythema: no back redness  Scars: absent    Comments:  Skin:normal  Surgical scar : Absent---  Spinous process;  Cervical lordosis :absent   Spinal curves:   kyphosis absent and scoliosis absent  Thoracic spine:  kyphosis present and scoliosis absent  Alignment of the shoulder scapula and iliac crests: symmetric  Paraspinal muscles:  Spasm:  Absent  Bilateral  Movements of the cervical spine as indicated above are: diminished range with pain   Facet loading---  : Right side--    Pain-Moderate                                   Left side----   Pain  Moderate  Cervical traction test :   Right side---:  Positive   Left side-----:   Negative  Spurling's Test   Right side---:  Positive  Left side-----:  Negative            Nurses Notes and Vital Signs reviewed.     DATA  Labs:  Benzodiazepine Screen, Urine   Date Value Ref Range Status   01/21/2018 NEGATIVE NEG Final     Comment:           (Positive cutoff 200 ng/mL)                      Imaging:  Radiology Images and Reports reviewed where indicated and necessary  HISTORY:   ORDERING SYSTEM PROVIDED HISTORY: Neck pain   TECHNOLOGIST PROVIDED HISTORY:   Ordering Physician Provided Reason for Exam: NECK PAIN   Acuity: Acute   Type of Exam: Subsequent/Follow-up   Additional signs and symptoms: PT C/O NECK PAIN INTO LEFT SHOULDER X 3   MONTHS; NO KNOWN INJURY       FINDINGS:   BONES/ALIGNMENT: The vertebral body heights appear maintained.  Straightening   of the normal cervical lordosis.  No spondylolisthesis.  An intraosseous   hemangioma is seen of the C4 vertebral body.  The bone marrow signal   demonstrates no acute abnormality.       SPINAL CORD: No abnormal cord signal or enhancement seen.       SOFT TISSUES: No paraspinal mass.       C2-C3: There is a central disc protrusion with buckling of the ligamentum   flavum contributing to minimal spinal canal stenosis.  No neural foraminal   narrowing.       C3-C4: There is a central disc protrusion without significant spinal canal   stenosis.  Uncovertebral joint and facet arthrosis contribute to mild right   neural foraminal narrowing.  The left neural foramen appears patent.       C4-C5: There is a central disc protrusion without significant spinal canal   stenosis.  No neural foraminal narrowing.       C5-C6: There is a disc bulge with buckling of the ligamentum flavum.  No   significant spinal canal stenosis.  No significant neural foraminal narrowing.       C6-C7: There is a disc bulge with buckling of the ligamentum flavum.  No   significant spinal canal stenosis.  Uncovertebral joint and facet arthrosis   contributes to severe bilateral neural foraminal narrowing.       C7-T1: There is no significant disc bulge, spinal canal stenosis or neural   foraminal narrowing.           Impression   1. Degenerative changes of the cervical spine contribute to minimal spinal   canal stenosis at C2-C3.  No significant spinal canal stenosis at the   remaining levels. 2. Multilevel neural foraminal narrowing is seen as above.  This is most   severe at C6-C7. 3. No spondylolisthesis. Patient Active Problem List   Diagnosis    Lumbago    Uric acid nephrolithiasis    Backache    Degenerative disc disease, cervical    History of colon polyps    Type 2 diabetes mellitus without complication, without long-term current use of insulin (HCC)    Adjustment disorder with depressed mood    Dyslipidemia    History of renal calculi    Upper back pain    Class 1 obesity due to excess calories with serious comorbidity and body mass index (BMI) of 30.0 to 30.9 in adult        [address] is a 47 y.o. male with     1. Cervical radiculopathy    2. Degenerative disc disease, cervical    3. Osteoarthritis of spine with radiculopathy, cervical region           PLAN  Patient's   [] x-ray    [] CT scan    [x] MRI  Were/was  Reviewed. These findings are consistent with the patient's symptoms and physical examination.     [] Patient's findings on the x-ray were explained to the patient using a bone modal.    Other reports reviewed include    [] Bone scan   [] EMG and nerve conduction studies   [] Referral reports-  I also discussed with him the following treatment options Including advantages and disadvantages of each:    [] Physical therapy    [] Interventional pain treatment    [] Medication management    [] Surgical options    Patient's OARRS were reviewed. It is acceptable and appears patient is not receiving prescriptions from multiple prescribers. Patient is  forthcoming regarding prescriptions for pain medication in the past  Controlled Substances Monitoring: Periodic Controlled Substance Monitoring: No signs of potential drug abuse or diversion identified. , Random urine drug screen sent today. (Cheli Michele MD)    The following screens were also reviewed  SOAPP- the score is 1. (Values:cates patient is  <4minimal potential  4-7 Moderate potential  >7 High potential  for drug addiction  Counselling/Preventive measures for pain  Control:    [x]  Spine strengthening exercises are discussed with patient in detail. Patient is counseled on importance of exercise and,core strengthening. Some  specific exercises to strengthen the abdominal muscles and low back muscles Were discussed. Also aquatic (water) physical therapy and benefits were explained to patient. including \" Water supports the body and minimizes the effect of gravity, making it easier for patients to start an exercise program.\"   The following important principles were discussed with patient:  1. Limit Bed Rest--Studies show that people with short-term low-back pain who rest feel more pain and have a harder time with daily tasks than those who stay active. 2. Keep Exercising-patient is advised to stay away from strenuous activities like gardening and avoid whatever motion caused the pain in the first place.   3. Maintain Good Posture-Exercises  to maintain good posture were shown to patient. 4. To do exercises learned in PT regularly. [x]Information (handout) on exercise was  given to patient.   The following treatment plan was developed after discussion with patient:  We discussed cervical Epidural steroid Injections x 1  at C5 and C6  Patient tried and failed NSAIDS,Home exercises, Physical Therapy, Chiropractic manipulations without relief. Patient exhibited signs of radiculopathy with positive cervical traction  test on right  Patient has not had prior cervical Surgery. We will see the patient in 2 weeks after the procedures and re-evaluate symptoms. Orders Placed This Encounter   Procedures    Cervical/Thoracic Epidural Steroid Injection     Standing Status:   Future     Standing Expiration Date:   6/19/2020   Dinora Laboy For Surgical Procedures     Standing Status:   Future     Standing Expiration Date:   6/19/2020       Decision Making Process : Patient's health history and referral records thoroughly reviewed before focused physical examination and discussion with patient. Over 50% of today's visit is spent on examining the patient and counseling. Level of complexity of date to be reviewed is Moderate. The chart date reviewed include the following: Imaging Reports. Summary of Care. Time spent reviewing with patient the below reports:   Medication safety, Treatment options. Level of diagnosis and management options of this case is multiple: involving the following management options: Interventions as needed, medication management as appropriate, future visits, activity modification, heat/ice as needed, Urine drug screen as required. [x]The patient's questions were answered to the best of my abilities. This note was created using voice recognition software. There may be inaccuracies of transcription  that are inadvertently overlooked prior to the signature. There is any questions about the transcription please contact me.     Electronically signed by Dorotha Merlin, MD on 6/19/2019 at 3:33 PM

## 2019-06-24 ENCOUNTER — EMPLOYEE WELLNESS (OUTPATIENT)
Dept: OTHER | Age: 54
End: 2019-06-24

## 2019-06-24 ENCOUNTER — HOSPITAL ENCOUNTER (OUTPATIENT)
Dept: GENERAL RADIOLOGY | Age: 54
Discharge: HOME | End: 2019-06-26
Payer: COMMERCIAL

## 2019-06-24 ENCOUNTER — HOSPITAL ENCOUNTER (OUTPATIENT)
Dept: PAIN MANAGEMENT | Age: 54
Discharge: HOME | End: 2019-06-24
Payer: COMMERCIAL

## 2019-06-24 VITALS
SYSTOLIC BLOOD PRESSURE: 110 MMHG | BODY MASS INDEX: 31.15 KG/M2 | TEMPERATURE: 97.6 F | WEIGHT: 230 LBS | DIASTOLIC BLOOD PRESSURE: 77 MMHG | RESPIRATION RATE: 16 BRPM | HEART RATE: 76 BPM | OXYGEN SATURATION: 97 % | HEIGHT: 72 IN

## 2019-06-24 DIAGNOSIS — M54.12 CERVICAL RADICULOPATHY: Primary | ICD-10-CM

## 2019-06-24 DIAGNOSIS — M54.12 CERVICAL RADICULOPATHY: ICD-10-CM

## 2019-06-24 DIAGNOSIS — M47.22 OSTEOARTHRITIS OF SPINE WITH RADICULOPATHY, CERVICAL REGION: ICD-10-CM

## 2019-06-24 DIAGNOSIS — M50.30 DEGENERATIVE DISC DISEASE, CERVICAL: ICD-10-CM

## 2019-06-24 LAB
CHOLESTEROL/HDL RATIO: 2.7
CHOLESTEROL: 99 MG/DL
ESTIMATED AVERAGE GLUCOSE: 217 MG/DL
GLUCOSE BLD-MCNC: 148 MG/DL (ref 70–99)
HBA1C MFR BLD: 9.2 % (ref 4–6)
HDLC SERPL-MCNC: 37 MG/DL
LDL CHOLESTEROL: 15 MG/DL (ref 0–130)
PATIENT FASTING?: YES
TRIGL SERPL-MCNC: 233 MG/DL
VLDLC SERPL CALC-MCNC: ABNORMAL MG/DL (ref 1–30)

## 2019-06-24 PROCEDURE — 62325 NJX INTERLAMINAR CRV/THRC: CPT

## 2019-06-24 PROCEDURE — 3209999900 FLUORO FOR SURGICAL PROCEDURES

## 2019-06-24 PROCEDURE — 6360000002 HC RX W HCPCS: Performed by: PAIN MEDICINE

## 2019-06-24 PROCEDURE — 6360000004 HC RX CONTRAST MEDICATION: Performed by: PAIN MEDICINE

## 2019-06-24 PROCEDURE — 62321 NJX INTERLAMINAR CRV/THRC: CPT | Performed by: PAIN MEDICINE

## 2019-06-24 PROCEDURE — 2580000003 HC RX 258: Performed by: PAIN MEDICINE

## 2019-06-24 RX ORDER — SODIUM CHLORIDE, SODIUM LACTATE, POTASSIUM CHLORIDE, CALCIUM CHLORIDE 600; 310; 30; 20 MG/100ML; MG/100ML; MG/100ML; MG/100ML
75 INJECTION, SOLUTION INTRAVENOUS CONTINUOUS
Status: DISCONTINUED | OUTPATIENT
Start: 2019-06-24 | End: 2019-06-25 | Stop reason: HOSPADM

## 2019-06-24 RX ORDER — BETAMETHASONE SODIUM PHOSPHATE AND BETAMETHASONE ACETATE 3; 3 MG/ML; MG/ML
INJECTION, SUSPENSION INTRA-ARTICULAR; INTRALESIONAL; INTRAMUSCULAR; SOFT TISSUE
Status: COMPLETED | OUTPATIENT
Start: 2019-06-24 | End: 2019-06-24

## 2019-06-24 RX ORDER — MIDAZOLAM HYDROCHLORIDE 1 MG/ML
INJECTION INTRAMUSCULAR; INTRAVENOUS
Status: COMPLETED | OUTPATIENT
Start: 2019-06-24 | End: 2019-06-24

## 2019-06-24 RX ADMIN — SODIUM CHLORIDE, POTASSIUM CHLORIDE, SODIUM LACTATE AND CALCIUM CHLORIDE 75 ML/HR: 600; 310; 30; 20 INJECTION, SOLUTION INTRAVENOUS at 08:13

## 2019-06-24 RX ADMIN — BETAMETHASONE SODIUM PHOSPHATE AND BETAMETHASONE ACETATE 4 MG: 3; 3 INJECTION, SUSPENSION INTRA-ARTICULAR; INTRALESIONAL; INTRAMUSCULAR at 08:48

## 2019-06-24 RX ADMIN — IOHEXOL 3 ML: 180 INJECTION INTRAVENOUS at 08:50

## 2019-06-24 RX ADMIN — MIDAZOLAM 2 MG: 1 INJECTION INTRAMUSCULAR; INTRAVENOUS at 08:51

## 2019-06-24 ASSESSMENT — PAIN DESCRIPTION - DESCRIPTORS: DESCRIPTORS: ACHING;CONSTANT;DULL;NAGGING;SHARP

## 2019-06-24 ASSESSMENT — PAIN - FUNCTIONAL ASSESSMENT
PAIN_FUNCTIONAL_ASSESSMENT: 0-10

## 2019-06-24 NOTE — PROCEDURES
Pre-Procedure Note    Patient Name: Linda Kwong   YOB: 1965  Room/Bed: Room/bed info not found  Medical Record Number: [id number]  Date: 6/24/2019       Indication:    1. Cervical radiculopathy    2. Degenerative disc disease, cervical    3. Osteoarthritis of spine with radiculopathy, cervical region        Consent: On file. Vital Signs:   Vitals:    06/24/19 0839   BP: 109/74   Pulse: 60   Resp: 18   Temp:    SpO2: 95%       Past Medical History:   has a past medical history of Backache, unspecified, History of colon polyps, Ileus ([address] Infected seroma, postoperative, MRSA (methicillin resistant staph aureus) culture positive, Osteoarthritis, PONV (postoperative nausea and vomiting), Pure hypercholesterolemia, Sleep apnea, Tubular adenoma of colon, Type II or unspecified type diabetes mellitus without mention of complication, not stated as uncontrolled, and Uric acid nephrolithiasis. Past Surgical History:   has a past surgical history that includes Lithotripsy; Colonoscopy (07/16/2016); pre-malignant / benign skin lesion excision (12/01/14); lipectomy; Refractive surgery (Bilateral); Cardiac catheterization; Hammer toe surgery (Right); other surgical history (3/6/15); other surgical history (4-14-15); other surgical history (4/24/2015); other surgical history (Left, 3 31 16); Colonoscopy (N/A, 3/16/2019); and Abdomen surgery (Left, 4/29/2019). Pre-Sedation Documentation and Exam:   Vital signs have been reviewed (see flow sheet for vitals). Mallampati Airway Assessment:  normal    ASA Classification:  Class 3 - A patient with severe systemic disease that limits activity but is not incapacitating    Sedation/ Anesthesia Plan:   intravenous sedation  as needed. Medications Planned:   midazolam (Versed) / Fentanyl  Intravenously  as needed.     Patient is an appropriate candidate for plan of sedation: yes  Patient's History and Physical examination was reviewed and there is no

## 2019-06-24 NOTE — PROGRESS NOTES
Discharge criteria met. Patient alert and oriented x3  Post procedure dressing dry and intact. Sensory and motor function intact as per pre-procedure. Instructions and follow up reviewed with pt at patient at discharge.   Patient discharged ambulatory @4612          Patient discharged ambulatory

## 2019-06-25 ENCOUNTER — TELEPHONE (OUTPATIENT)
Dept: PAIN MANAGEMENT | Age: 54
End: 2019-06-25

## 2019-07-01 VITALS — BODY MASS INDEX: 30.79 KG/M2 | WEIGHT: 227 LBS

## 2019-07-08 ENCOUNTER — CLINICAL DOCUMENTATION (OUTPATIENT)
Dept: PHARMACY | Facility: CLINIC | Age: 54
End: 2019-07-08

## 2019-07-08 NOTE — PROGRESS NOTES
Pharmacy Pop Care Documentation:      The application for [facility] for enrollment into the diabetes management program has been reviewed and accepted on 7/8/19.     Catrachita Erickson

## 2019-07-10 ENCOUNTER — TELEPHONE (OUTPATIENT)
Dept: PHARMACY | Facility: CLINIC | Age: 54
End: 2019-07-10

## 2019-07-13 DIAGNOSIS — E11.9 TYPE 2 DIABETES MELLITUS WITHOUT COMPLICATION, WITHOUT LONG-TERM CURRENT USE OF INSULIN (HCC): ICD-10-CM

## 2019-07-15 RX ORDER — ATORVASTATIN CALCIUM 20 MG/1
20 TABLET, FILM COATED ORAL DAILY
Qty: 90 TABLET | Refills: 3 | Status: SHIPPED | OUTPATIENT
Start: 2019-07-15 | End: 2019-08-20 | Stop reason: SDUPTHER

## 2019-07-22 RX ORDER — ERGOCALCIFEROL 1.25 MG/1
CAPSULE ORAL
Qty: 12 CAPSULE | Refills: 0 | Status: SHIPPED | OUTPATIENT
Start: 2019-07-22 | End: 2020-03-20

## 2019-08-06 ENCOUNTER — TELEPHONE (OUTPATIENT)
Dept: INTERNAL MEDICINE CLINIC | Age: 54
End: 2019-08-06

## 2019-08-06 RX ORDER — BLOOD-GLUCOSE METER
EACH MISCELLANEOUS
Qty: 1 KIT | Refills: 0 | Status: SHIPPED | OUTPATIENT
Start: 2019-08-06 | End: 2019-09-25

## 2019-08-06 RX ORDER — BLOOD-GLUCOSE CONTROL, LOW
EACH MISCELLANEOUS
Qty: 300 EACH | Refills: 3 | Status: SHIPPED | OUTPATIENT
Start: 2019-08-06

## 2019-08-06 NOTE — TELEPHONE ENCOUNTER
Patient has not read previous [employer] message sent on 7/10/19. Called patient to schedule yearly pharmacist appointment to discuss medications for Diabetes Management Program.    Spoke to patient and appointment scheduled for 8/08/29 at 5:30 PM.  Patient advised that he has not received glucose testing strips and supplies in over a year and a half. Patient is agreeable to the [employer].   Was testing TID in the past.  Provider: Dr. Harvey Robel: [place], [address]   Department, toll free: [phone], option 7

## 2019-08-08 ENCOUNTER — SCHEDULED TELEPHONE ENCOUNTER (OUTPATIENT)
Dept: PHARMACY | Facility: CLINIC | Age: 54
End: 2019-08-08

## 2019-08-08 NOTE — TELEPHONE ENCOUNTER
[facility]) Employee Diabetes Program  =================================================================  Diana Fragoso is a 47 y.o. male enrolled in the [address] [place] [address] Diabetes Program.    Medications:  Medication Sig Comments    Blood Glucose Monitoring Suppl (PRODIGY AUTOCODE BLOOD GLUCOSE) w/Device KIT Use to test blood glucose 3 times per day Taking as prescribed        PRODIGY LANCETS 28G MISC Use to test blood glucose 3 times per day Taking as prescribed        blood glucose test strips (PRODIGY NO CODING BLOOD GLUC) strip Use to test blood glucose 3 times per day Taking as prescribed        atorvastatin (LIPITOR) 20 MG tablet Take 1 tablet by mouth daily TAKE 1 TABLET BY MOUTH ONE TIME A DAY Updated per patient to two 20 mg tablets daily. States his cardiologist increased the dose. He will ask Dr. Brody Cooley for an updated script at next OV. Currently filling at [facility]. Patient understands to call mail order pharmacy 1-2 weeks prior to being due for fill to have them transfer it.  metFORMIN (GLUCOPHAGE) 1000 MG tablet Take 1 tablet by mouth 2 times daily (with meals) Taking as prescribed    Denies issues with diarrhea/GI upset. Patient understands to call mail order pharmacy 1-2 weeks prior to being due for fill to have them transfer it from [facility].  cyclobenzaprine (FLEXERIL) 10 MG tablet Take 10 mg by mouth 2 times daily as needed for Muscle spasms Taking as prescribed        canagliflozin (INVOKANA) 300 MG TABS tablet Take 1 tablet by mouth every morning (before breakfast) Taking as prescribed        meloxicam (MOBIC) 15 MG tablet Take 1 tablet by mouth daily Updated to as needed per patient    allopurinol (ZYLOPRIM) 300 MG tablet Take 1 tablet by mouth daily Taking as prescribed        aspirin 81 MG tablet Take 81 mg by mouth daily. Updated to [facility]3] tablet per patient. He would like Rx to be sent to mail order.      vitamin D (ERGOCALCIFEROL) 00263 units CAPS capsule TAKE 1 CAPSULE BY MOUTH ONE TIME A WEEK  Taking as prescribed    Removed duplicate. Current Pharmacy: [facility], [facility] (allopurinol), [facility] (Atorvastatin, Metformin). Patient agreeable to changing to [facility] for Atorvastatin, Metformin, aspirin. Would like to keep getting Invokana at site pharmacy since it is free either way. Current testing supplies/frequency: Prodigy TID orders sent to [facility] on 8/7/19. They did not have a lancing device for patient. Pen needles/syringes: N/A    Allergies: Allergies   Allergen Reactions    Doxycycline Hives    Fentanyl Other (See Comments)     DEVELOPED ILEUS    Lisinopril Other (See Comments)    Other      Chromium cat gut suture      Vitals/Labs:  BP Readings from Last 3 Encounters:   06/24/19 110/77   06/19/19 134/76   04/29/19 91/63     Lab Results   Component Value Date    LABMICR CANNOT BE CALCULATED 04/24/2019     Lab Results   Component Value Date    LABA1C 9.2 (H) 06/24/2019    LABA1C 10.4 (H) 04/24/2019    LABA1C 7.7 (H) 08/10/2018     Lab Results   Component Value Date    CHOL 99 06/24/2019    TRIG 233 (H) 06/24/2019    HDL 37 (L) 06/24/2019    LDLCHOLESTEROL 15 06/24/2019     ALT   Date Value Ref Range Status   04/24/2019 22 5 - 41 U/L Final     AST   Date Value Ref Range Status   04/24/2019 17 <40 U/L Final     The ASCVD Risk score (Olivia Long, et al., 2013) failed to calculate for the following reasons: The valid total cholesterol range is 130 to 320 mg/dL     Lab Results   Component Value Date    CREATININE 1.02 04/24/2019     CrCl cannot be calculated (Unknown ideal weight. ).   eGFR: >60 mL/min/1.73 m^2    Immunizations:  Immunization History   Administered Date(s) Administered    Influenza Vaccine, unspecified formulation 11/01/2016    Influenza Virus Vaccine 10/06/2017, 12/03/2018    Pneumococcal Conjugate 13-valent ([id number]) 09/07/2016    Pneumococcal Polysaccharide ([id number]) 11/20/2013, 03/17/2015    Tdap (Boostrix, Adacel) 08/27/2015 Social History:  Social History     Tobacco Use    Smoking status: Never Smoker    Smokeless tobacco: Never Used   Substance Use Topics    Alcohol use: No     ASSESSMENT:  Ongoing Program Requirements (Y indicates has completed for the year, N indicates needs to be completed by 12/31/19): Yes - OV with provider for DM (2nd)  No - ACC/diabetes educator visit (if A1c over 8%) - discussed with patient. Recently had steroid shots which is causing issues for his A1c. Patient would like to get A1c checked in September to see what his level looks like. Will send Norberto Mason RD's number via [employer]. Yes - A1c (2nd)  Yes - Lipid panel  Yes - Urine microalbumin  Yes - Pneumococcal vaccination: Pneumovax 23 received 11/20/13 and 3/17/15  No - Influenza vaccination for Fall 2019  No - Medication adherence over 70%  Yes - On statin or contraindication(s) Atorvastatin (filled at [facility] on 7/15/19 for 90ds with refills remaining per staff member)  Yes - On ACEi/ARB or contraindication(s) Normal blood pressure, urinary albumin-to-creatinine ratio, and eGFR      Formulary Medication Review:  Non-formulary or medications with cost-effective alternatives: Patient is on preferred agents. Current medications eligible for copay waiver, up to $300, through [facility] (mail order) Pharmacy:  - Atorvastatin, Metformin, Invokana, and Aspirin (with prescription)  - Generic ([facility] pharmacy-stocked) insulin syringes and pen needles  - Agamatrix or Prodigy meter and supplies     Diabetes Care:   - Glycemic Goal: <7.0%. Is no longer at blood glucose goal but is on Metformin and Invokana therapy. - Duplicate MOA: N/A  - Reduce Pill [place]: Consider combining Metformin and Invokana to Invokamet - patient is not interested at this time. - Appropriateness of Insulin Therapy: Will wait for next A1c to decide patient eligibility. Patient believes elevation is due to recent steroid injections.    - Therapy Optimization: Will wait for [phone]    CLINICAL PHARMACY CONSULT: MED RECONCILIATION/REVIEW ADDENDUM    For Pharmacy Admin Tracking Only    PHSO: Yes  Total # of Interventions Recommended: 2  - New Order #: 1 New Medication Order Reason(s): Patient Preference  - Updated Order #: 1 Updated Order Reason(s):  Other  Recommended intervention potential cost savings: 1  Total Interventions Accepted: 2  Time Spent (min): [address], PharmD  [address]

## 2019-08-12 RX ORDER — ASPIRIN 81 MG/1
81 TABLET ORAL DAILY
Qty: 100 TABLET | Refills: 3 | Status: SHIPPED | OUTPATIENT
Start: 2019-08-12

## 2019-08-20 ENCOUNTER — OFFICE VISIT (OUTPATIENT)
Dept: INTERNAL MEDICINE CLINIC | Age: 54
End: 2019-08-20
Payer: COMMERCIAL

## 2019-08-20 VITALS
SYSTOLIC BLOOD PRESSURE: 108 MMHG | WEIGHT: 230 LBS | HEIGHT: 72 IN | DIASTOLIC BLOOD PRESSURE: 70 MMHG | BODY MASS INDEX: 31.15 KG/M2

## 2019-08-20 DIAGNOSIS — E11.9 TYPE 2 DIABETES MELLITUS WITHOUT COMPLICATION, WITHOUT LONG-TERM CURRENT USE OF INSULIN (HCC): Primary | ICD-10-CM

## 2019-08-20 DIAGNOSIS — E78.5 DYSLIPIDEMIA: ICD-10-CM

## 2019-08-20 DIAGNOSIS — E66.09 CLASS 1 OBESITY DUE TO EXCESS CALORIES WITH SERIOUS COMORBIDITY AND BODY MASS INDEX (BMI) OF 30.0 TO 30.9 IN ADULT: ICD-10-CM

## 2019-08-20 PROCEDURE — 99214 OFFICE O/P EST MOD 30 MIN: CPT | Performed by: INTERNAL MEDICINE

## 2019-08-20 RX ORDER — ATORVASTATIN CALCIUM 40 MG/1
40 TABLET, FILM COATED ORAL DAILY
Qty: 90 TABLET | Refills: 3 | Status: SHIPPED | OUTPATIENT
Start: 2019-08-20 | End: 2020-06-02 | Stop reason: SDUPTHER

## 2019-08-20 ASSESSMENT — ENCOUNTER SYMPTOMS
EYE DISCHARGE: 0
WHEEZING: 0
BLOOD IN STOOL: 0
TROUBLE SWALLOWING: 0
COUGH: 0
ABDOMINAL DISTENTION: 0
SHORTNESS OF BREATH: 0
COLOR CHANGE: 0
EYE PAIN: 0
DIARRHEA: 0

## 2019-08-20 NOTE — PROGRESS NOTES
Nightly Noni Mock MD        acetaminophen (TYLENOL) tablet 650 mg  650 mg Oral Q6H PRN Noni Mock MD        ibuprofen (ADVIL;MOTRIN) tablet 800 mg  800 mg Oral Q6H PRN Noni Mock MD        insulin lispro (HUMALOG) injection vial 0-6 Units  0-6 Units Subcutaneous TID WC Noni Mock MD         ALLERGIES:    Allergies   Allergen Reactions    Doxycycline Hives    Fentanyl Other (See Comments)     DEVELOPED ILEUS    Lisinopril Other (See Comments)    Other      Chromium cat gut suture       Social History     Tobacco Use    Smoking status: Never Smoker    Smokeless tobacco: Never Used   Substance Use Topics    Alcohol use: No      Body mass index is 31.19 kg/m². /70   Ht 6' 0.01\" (1.829 m)   Wt 230 lb (104.3 kg)   BMI 31.19 kg/m²     Lab Results   Component Value Date     04/24/2019    K 4.9 04/24/2019     04/24/2019    CO2 24 04/24/2019    BUN 17 04/24/2019    CREATININE 1.02 04/24/2019    GLUCOSE 148 06/24/2019    GLUCOSE 115 09/23/2011    CALCIUM 9.1 04/24/2019    PROT 7.2 04/24/2019    PROT S.P.E SHOWS THE USUAL ELECTROPHORETIC PATTERN 04/24/2019    LABALBU 4.5 04/24/2019    LABALBU 4.7 09/23/2011    BILITOT 0.41 04/24/2019    ALKPHOS 109 04/24/2019    AST 17 04/24/2019    ALT 22 04/24/2019       Lab Results   Component Value Date    WBC 3.4 04/24/2019    RBC 5.64 04/24/2019    RBC 5.15 09/23/2011    HGB 15.9 04/24/2019    HCT 48.4 04/24/2019    MCV 85.8 04/24/2019    MCH 28.2 04/24/2019    MCHC 32.9 04/24/2019    RDW 13.5 04/24/2019     04/24/2019     09/23/2011    MPV 6.6 04/24/2019       Lab Results   Component Value Date    LABA1C 9.2 06/24/2019       Lab Results   Component Value Date    HDL 37 06/24/2019    LDLCHOLESTEROL 15 06/24/2019       Assessment / Plan:       Diagnosis Orders   1. Type 2 diabetes mellitus without complication, without long-term current use of insulin (HCC)  Hemoglobin A1C   2.  Dyslipidemia  atorvastatin (LIPITOR) 40 MG

## 2019-08-29 NOTE — TELEPHONE ENCOUNTER
Noted Atorvastatin 40 mg order was sent to [place] [id number] on 8/20/19 and has not been picked up yet. Has a $24.99 copay for 90ds. Please contact patient to see if he would like us to back out the Atorvastatin order at the site pharmacy, so it can be filled for free through the mail order pharmacy.      Thank you,    Yoko Artis, PharmD  [address] [place] [address] Pharmacist  [phone] or [phone] (Option 7)

## 2019-08-29 NOTE — TELEPHONE ENCOUNTER
Patient would like to fill at mail order pharmacy and patient stated that he is missing the lancet device

## 2019-08-30 RX ORDER — BLOOD-GLUCOSE CONTROL, LOW
EACH MISCELLANEOUS
Qty: 1 EACH | Refills: 0 | Status: SHIPPED | OUTPATIENT
Start: 2019-08-30

## 2019-09-18 RX ORDER — ALLOPURINOL 300 MG/1
300 TABLET ORAL DAILY
Qty: 90 TABLET | Refills: 3 | Status: SHIPPED | OUTPATIENT
Start: 2019-09-18 | End: 2020-06-02 | Stop reason: SDUPTHER

## 2019-09-21 ENCOUNTER — HOSPITAL ENCOUNTER (OUTPATIENT)
Age: 54
Discharge: HOME | End: 2019-09-21
Payer: COMMERCIAL

## 2019-09-21 DIAGNOSIS — E11.9 TYPE 2 DIABETES MELLITUS WITHOUT COMPLICATION, WITHOUT LONG-TERM CURRENT USE OF INSULIN (HCC): ICD-10-CM

## 2019-09-21 PROCEDURE — 83036 HEMOGLOBIN GLYCOSYLATED A1C: CPT

## 2019-09-21 PROCEDURE — 36415 COLL VENOUS BLD VENIPUNCTURE: CPT

## 2019-09-22 LAB
ESTIMATED AVERAGE GLUCOSE: 194 MG/DL
HBA1C MFR BLD: 8.4 % (ref 4–6)

## 2019-09-25 ENCOUNTER — OFFICE VISIT (OUTPATIENT)
Dept: INTERNAL MEDICINE CLINIC | Age: 54
End: 2019-09-25
Payer: COMMERCIAL

## 2019-09-25 VITALS
SYSTOLIC BLOOD PRESSURE: 122 MMHG | WEIGHT: 229.94 LBS | DIASTOLIC BLOOD PRESSURE: 74 MMHG | HEIGHT: 72 IN | BODY MASS INDEX: 31.14 KG/M2

## 2019-09-25 DIAGNOSIS — J20.9 ACUTE BRONCHITIS, UNSPECIFIED ORGANISM: Primary | ICD-10-CM

## 2019-09-25 PROCEDURE — 99213 OFFICE O/P EST LOW 20 MIN: CPT | Performed by: INTERNAL MEDICINE

## 2019-09-25 RX ORDER — LEVOFLOXACIN 750 MG/1
750 TABLET ORAL DAILY
Qty: 7 TABLET | Refills: 0 | Status: SHIPPED | OUTPATIENT
Start: 2019-09-25 | End: 2019-10-02

## 2019-10-09 ENCOUNTER — PATIENT MESSAGE (OUTPATIENT)
Dept: PHARMACY | Facility: CLINIC | Age: 54
End: 2019-10-09

## 2019-11-19 ENCOUNTER — TELEPHONE (OUTPATIENT)
Dept: PHARMACY | Facility: CLINIC | Age: 54
End: 2019-11-19

## 2019-11-25 ENCOUNTER — OFFICE VISIT (OUTPATIENT)
Dept: PODIATRY | Age: 54
End: 2019-11-25
Payer: COMMERCIAL

## 2019-11-25 DIAGNOSIS — M79.675 PAIN OF GREAT TOE, LEFT: ICD-10-CM

## 2019-11-25 DIAGNOSIS — L03.032 PARONYCHIA OF GREAT TOE, LEFT: ICD-10-CM

## 2019-11-25 DIAGNOSIS — L60.0 OC (ONYCHOCRYPTOSIS): Primary | ICD-10-CM

## 2019-11-25 DIAGNOSIS — L03.031 PARONYCHIA OF GREAT TOE, RIGHT: ICD-10-CM

## 2019-11-25 DIAGNOSIS — M79.674 PAIN OF RIGHT GREAT TOE: ICD-10-CM

## 2019-11-25 PROCEDURE — 11750 EXCISION NAIL&NAIL MATRIX: CPT | Performed by: PODIATRIST

## 2019-11-25 PROCEDURE — 99203 OFFICE O/P NEW LOW 30 MIN: CPT | Performed by: PODIATRIST

## 2019-11-26 ASSESSMENT — ENCOUNTER SYMPTOMS
VOMITING: 0
NAUSEA: 0
COLOR CHANGE: 0
DIARRHEA: 0
CONSTIPATION: 0

## 2019-11-27 ENCOUNTER — TELEPHONE (OUTPATIENT)
Dept: PHARMACY | Facility: CLINIC | Age: 54
End: 2019-11-27

## 2019-12-02 ENCOUNTER — CARE COORDINATION (OUTPATIENT)
Dept: CARE COORDINATION | Age: 54
End: 2019-12-02

## 2019-12-19 ENCOUNTER — CARE COORDINATION (OUTPATIENT)
Dept: CARE COORDINATION | Age: 54
End: 2019-12-19

## 2019-12-23 ENCOUNTER — TELEPHONE (OUTPATIENT)
Dept: INTERNAL MEDICINE CLINIC | Age: 54
End: 2019-12-23

## 2020-01-07 NOTE — TELEPHONE ENCOUNTER
Done
Patient was told prescription would be sent to pharmacy and would be contacted if prescription was not sent. Patient converted to Jardiance.     Ellie CondeD, [address]  Phone: [phone], or toll free [phone], option 7    CLINICAL PHARMACY CONSULT: MED RECONCILIATION/REVIEW ADDENDUM  For Pharmacy Admin Tracking Only  PHSO: Yes  Total # of Interventions Recommended: 1  - New Order #: 1 New Medication Order Reason(s): Cost Conversion  Recommended intervention potential cost savings: 1  Total Interventions Accepted: 1  Time Spent (min): 30
Reviewed and agree with pharmacy fellow.     Ana Shaw, PharmD, [facility] Pharmacist  Dept: [phone] (toll free [phone], option 7)
agrees, switch as above.   - Requests 90-day rx to [facility] mail order pharmacy    Cherri Yeager, PharmD, [address]  Phone: [phone], or toll free [phone], option 7

## 2020-01-16 ENCOUNTER — TELEPHONE (OUTPATIENT)
Dept: PHARMACY | Facility: CLINIC | Age: 55
End: 2020-01-16

## 2020-01-20 ENCOUNTER — SCHEDULED TELEPHONE ENCOUNTER (OUTPATIENT)
Dept: PHARMACY | Facility: CLINIC | Age: 55
End: 2020-01-20

## 2020-01-20 NOTE — LETTER
[address]  [address], [address]        [address] [facility] [address] [facility]  [address] [facility] [address]         01/20/20     Dear Isac Alba,    Thank you for taking the time to speak with me for the [address] [place] [address] Diabetes Program! Here is a summary and/or follow-up of some things we talked about:     Current medications eligible for copay waiver, up to $600, through [address]:   - aspirin, atorvastatin, Jardiance, metformin   - Generic ([address] [facility] pharmacy-stocked) insulin syringes and pen needles   - Prodigy (Tier 0) or Agamatrix (Tier 1, including Agamatrix [employer] capable) meter and supplies   - Dexcom G6 continuous glucose monitor and sensors (Tier 2)   Home Delivery (mail order) pharmacy: [phone], option 0 - please allow 2 weeks for processing and shipping prescriptions     Diabetes program gaps identified to remain eligible:   - Initial requirements, to be completed by 7/1/20: Office Visit with provider for diabetes (1st) and A1c (1st)   - Ongoing requirements, to be completed by 12/31/20: Office Visit with provider for diabetes (2nd), Ambulatory Care Coordinator/Diabetes Educator/Dietician interactions for diabetes education (only required if A1c over 8%), A1c (2nd), Lipid panel, Urine microalbumin, Influenza vaccination for [id number] and Medication adherence over 70%     *Documentation of any requirements completed or reviewed outside of the [address] [place] [address] record will need to be faxed or emailed (fax/email info below). Please work with your provider to ensure that the 2020 requirements are completed by the appropriate dates. Thank you,   Bonifacio Brower, PharmD, [address]   Department, toll free: [phone], option 7   Fax: [phone]   Email: [email or web address]

## 2020-02-04 NOTE — TELEPHONE ENCOUNTER
Noted [employer] message not yet read; will send as letter.
date  No - Influenza vaccination for Fall 2020 - is [address] [employer] employee  No - Medication adherence over 70% - per Sulema Domenic fill hx:  · [place] #90/90 6/7/19, 10/24/19*  · Jardiance #90/90ds 1/16/20  · Per External dispense hx, metformin: #180/90ds 1/24/19 & 7/17/19*  Yes - On statin - atorvastatin; per [facility] fill hx: #90/90ds 8/30/19 & 12/30/19*  (per external disp hx: #90/90ds 1/25/19 & 7/15/19)  Over-ride - On ACEi/ARB - Normal blood pressure, urinary albumin-to-creatinine ratio, and eGFR   *Noted 11/19/19 encounter re adherence    Formulary Medication Review:  Non-formulary or medications with cost-effective alternatives: n/a. Current medications eligible for copay waiver, up to $600, through [address]:  - aspirin, atorvastatin, Jardiance, metformin  - Generic ([facility] pharmacy-stocked) insulin syringes and pen needles  - Agamatrix or Prodigy meter and supplies  - Dexcom G6 supplies     Diabetes Care:   - Glycemic Goal: <7.0% and directed by provider. Not at goal, but improved! Possible medication adherence concerns (refill hx gaps), however patient denies. States \"I just have to watch my diet closer, which should be easier with the holidays over\". - Tablet burden: Did not review with patient potential Synjardy combination tablet since he has not yet switched to Jardiance from [facility] and he previously declined Invokamet combo tablet. Other Considerations:  - Blood Pressure Goal: BP less than 140/90 mmHg due to history of DM: Is at blood pressure goal.   - Lipids: prescribed high intensity statin (atorvastatin 40mg daily).  Monitor LDL (last LDL 15; prev was 68)    PLAN:  - DM program gaps identified:   · Initial requirements: OV with provider for DM (1st) and A1c (1st)   · Ongoing requirements: OV with provider for DM (2nd), ACC/diabetes educator visit (if A1c over 8%), A1c (2nd), Lipid panel, Urine microalbumin, Influenza vaccination for [id number] and Medication adherence over 70%    -

## 2020-03-20 RX ORDER — ERGOCALCIFEROL 1.25 MG/1
CAPSULE ORAL
Qty: 12 CAPSULE | Refills: 0 | Status: SHIPPED | OUTPATIENT
Start: 2020-03-20 | End: 2020-06-02 | Stop reason: SDUPTHER

## 2020-04-06 ENCOUNTER — TELEPHONE (OUTPATIENT)
Dept: PHARMACY | Facility: CLINIC | Age: 55
End: 2020-04-06

## 2020-04-13 ENCOUNTER — CLINICAL DOCUMENTATION (OUTPATIENT)
Dept: PHARMACY | Facility: CLINIC | Age: 55
End: 2020-04-13

## 2020-06-02 ENCOUNTER — TELEPHONE (OUTPATIENT)
Dept: INTERNAL MEDICINE CLINIC | Age: 55
End: 2020-06-02

## 2020-06-02 RX ORDER — ATORVASTATIN CALCIUM 40 MG/1
40 TABLET, FILM COATED ORAL DAILY
Qty: 90 TABLET | Refills: 3 | Status: SHIPPED | OUTPATIENT
Start: 2020-06-02

## 2020-06-02 RX ORDER — ALLOPURINOL 300 MG/1
300 TABLET ORAL DAILY
Qty: 90 TABLET | Refills: 3 | Status: SHIPPED | OUTPATIENT
Start: 2020-06-02

## 2020-06-02 RX ORDER — ERGOCALCIFEROL 1.25 MG/1
CAPSULE ORAL
Qty: 12 CAPSULE | Refills: 0 | Status: SHIPPED | OUTPATIENT
Start: 2020-06-02

## 2020-06-19 ENCOUNTER — TELEPHONE (OUTPATIENT)
Dept: INTERNAL MEDICINE CLINIC | Age: 55
End: 2020-06-19

## 2020-12-18 ENCOUNTER — TELEPHONE (OUTPATIENT)
Dept: INTERNAL MEDICINE CLINIC | Age: 55
End: 2020-12-18

## 2020-12-21 ENCOUNTER — TELEPHONE (OUTPATIENT)
Dept: INTERNAL MEDICINE CLINIC | Age: 55
End: 2020-12-21

## 2020-12-21 NOTE — TELEPHONE ENCOUNTER
Called pt to schedule a1c. He states he has moved out of state and is no longer a pt of Dr Stacey Bowman.

## 2021-06-17 RX ORDER — ALLOPURINOL 300 MG/1
TABLET ORAL
Qty: 90 TABLET | Refills: 1 | OUTPATIENT
Start: 2021-06-17

## (undated) DEVICE — Z DUPLICATE USE 2527422 TUBING O2 STD 7 FT EXTN NO CRUSH VISUAL CNTRST LF

## (undated) DEVICE — JELLY,LUBE,STERILE,FLIP TOP,TUBE,2-OZ: Brand: MEDLINE

## (undated) DEVICE — GLOVE ORANGE PI 7 1/2   MSG9075

## (undated) DEVICE — Z DISCONTINUED USE 2424143 ADAPTER O2 SWVL CHRISTMAS TREE GRN

## (undated) DEVICE — SNARE ENDOSCP L240CM LOOP W13MM DIA2.4MM SHT THROW SM OVL

## (undated) DEVICE — STRAP,POSITIONING,KNEE/BODY,FOAM,4X60": Brand: MEDLINE

## (undated) DEVICE — GAUZE,SPONGE,FLUFF,6"X6.75",STRL,5/TRAY: Brand: MEDLINE

## (undated) DEVICE — GLOVE ORANGE PI 7   MSG9070

## (undated) DEVICE — SOLUTION IV IRRIG POUR BRL 0.9% SODIUM CHL 2F7124

## (undated) DEVICE — GOWN,AURORA,NONREINFORCED,LARGE: Brand: MEDLINE

## (undated) DEVICE — SHEET, T, LAPAROTOMY, STERILE: Brand: MEDLINE

## (undated) DEVICE — DEFENDO AIR WATER SUCTION AND BIOPSY VALVE KIT FOR  OLYMPUS: Brand: DEFENDO AIR/WATER/SUCTION AND BIOPSY VALVE

## (undated) DEVICE — Z INACTIVE USE 2525529 CONNECTOR TBNG FOR O2

## (undated) DEVICE — SURGICAL PROCEDURE PACK MIN B

## (undated) DEVICE — ST CHARLES MINOR ABDOMINAL PK: Brand: MEDLINE INDUSTRIES, INC.

## (undated) DEVICE — CANNULA NSL AD TBNG L7FT PVC STR NONFLARED PRNG O2 DEL W STD

## (undated) DEVICE — PAD,ABDOMINAL,8"X7.5",ST,LF,20/BX: Brand: MEDLINE INDUSTRIES, INC.

## (undated) DEVICE — GOWN,POLY REINFORCED,LG: Brand: MEDLINE

## (undated) DEVICE — STANDARD HYPODERMIC NEEDLE,POLYPROPYLENE HUB: Brand: MONOJECT